# Patient Record
Sex: FEMALE | Race: BLACK OR AFRICAN AMERICAN | Employment: FULL TIME | URBAN - METROPOLITAN AREA
[De-identification: names, ages, dates, MRNs, and addresses within clinical notes are randomized per-mention and may not be internally consistent; named-entity substitution may affect disease eponyms.]

---

## 2017-03-03 ENCOUNTER — HOSPITAL ENCOUNTER (OUTPATIENT)
Dept: ULTRASOUND IMAGING | Age: 27
Discharge: HOME | End: 2017-03-03
Payer: COMMERCIAL

## 2017-03-03 DIAGNOSIS — N64.4 BREAST PAIN: ICD-10-CM

## 2017-03-03 PROCEDURE — 76642 ULTRASOUND BREAST LIMITED: CPT

## 2019-02-27 ENCOUNTER — HOSPITAL ENCOUNTER (EMERGENCY)
Age: 29
Discharge: HOME | End: 2019-02-27
Attending: EMERGENCY MEDICINE
Payer: COMMERCIAL

## 2019-02-27 VITALS
BODY MASS INDEX: 24.11 KG/M2 | DIASTOLIC BLOOD PRESSURE: 70 MMHG | HEART RATE: 72 BPM | SYSTOLIC BLOOD PRESSURE: 110 MMHG | OXYGEN SATURATION: 100 % | HEIGHT: 66 IN | WEIGHT: 150 LBS | TEMPERATURE: 99.2 F | RESPIRATION RATE: 18 BRPM

## 2019-02-27 DIAGNOSIS — R05.8 PRODUCTIVE COUGH: ICD-10-CM

## 2019-02-27 DIAGNOSIS — J20.9 ACUTE BRONCHITIS, UNSPECIFIED ORGANISM: Primary | ICD-10-CM

## 2019-02-27 PROCEDURE — 99282 EMERGENCY DEPT VISIT SF MDM: CPT

## 2019-02-27 RX ORDER — BENZONATATE 100 MG/1
100 CAPSULE ORAL
Qty: 30 CAP | Refills: 0 | Status: SHIPPED | OUTPATIENT
Start: 2019-02-27 | End: 2019-03-06

## 2019-02-27 RX ORDER — BISMUTH SUBSALICYLATE 262 MG
1 TABLET,CHEWABLE ORAL DAILY
COMMUNITY
End: 2022-01-31

## 2019-02-27 RX ORDER — AZITHROMYCIN 250 MG/1
TABLET, FILM COATED ORAL
Qty: 6 TAB | Refills: 0 | Status: SHIPPED | OUTPATIENT
Start: 2019-02-27 | End: 2019-04-08

## 2019-02-27 RX ORDER — GUAIFENESIN 600 MG/1
600 TABLET, EXTENDED RELEASE ORAL 2 TIMES DAILY
COMMUNITY
End: 2019-04-08

## 2019-02-27 NOTE — DISCHARGE INSTRUCTIONS
Patient Education        Bronchitis: Care Instructions  Your Care Instructions    Bronchitis is inflammation of the bronchial tubes, which carry air to the lungs. The tubes swell and produce mucus, or phlegm. The mucus and inflamed bronchial tubes make you cough. You may have trouble breathing. Most cases of bronchitis are caused by viruses like those that cause colds. Antibiotics usually do not help and they may be harmful. Bronchitis usually develops rapidly and lasts about 2 to 3 weeks in otherwise healthy people. Follow-up care is a key part of your treatment and safety. Be sure to make and go to all appointments, and call your doctor if you are having problems. It's also a good idea to know your test results and keep a list of the medicines you take. How can you care for yourself at home? · Take all medicines exactly as prescribed. Call your doctor if you think you are having a problem with your medicine. · Get some extra rest.  · Take an over-the-counter pain medicine, such as acetaminophen (Tylenol), ibuprofen (Advil, Motrin), or naproxen (Aleve) to reduce fever and relieve body aches. Read and follow all instructions on the label. · Do not take two or more pain medicines at the same time unless the doctor told you to. Many pain medicines have acetaminophen, which is Tylenol. Too much acetaminophen (Tylenol) can be harmful. · Take an over-the-counter cough medicine that contains dextromethorphan to help quiet a dry, hacking cough so that you can sleep. Avoid cough medicines that have more than one active ingredient. Read and follow all instructions on the label. · Breathe moist air from a humidifier, hot shower, or sink filled with hot water. The heat and moisture will thin mucus so you can cough it out. · Do not smoke. Smoking can make bronchitis worse. If you need help quitting, talk to your doctor about stop-smoking programs and medicines.  These can increase your chances of quitting for good.  When should you call for help? Call 911 anytime you think you may need emergency care. For example, call if:    · You have severe trouble breathing.    Call your doctor now or seek immediate medical care if:    · You have new or worse trouble breathing.     · You cough up dark brown or bloody mucus (sputum).     · You have a new or higher fever.     · You have a new rash.    Watch closely for changes in your health, and be sure to contact your doctor if:    · You cough more deeply or more often, especially if you notice more mucus or a change in the color of your mucus.     · You are not getting better as expected. Where can you learn more? Go to http://tannerPertinopaul.info/. Enter H333 in the search box to learn more about \"Bronchitis: Care Instructions. \"  Current as of: September 5, 2018  Content Version: 11.9  © 9545-2502 [employer]. Care instructions adapted under license by [employer] (which disclaims liability or warranty for this information). If you have questions about a medical condition or this instruction, always ask your healthcare professional. [place] [address] any warranty or liability for your use of this information. Patient Education        Cough: Care Instructions  Your Care Instructions    A cough is your body's response to something that bothers your throat or airways. Many things can cause a cough. You might cough because of a cold or the flu, bronchitis, or asthma. Smoking, postnasal drip, allergies, and stomach acid that backs up into your throat also can cause coughs. A cough is a symptom, not a disease. Most coughs stop when the cause, such as a cold, goes away. You can take a few steps at home to cough less and feel better. Follow-up care is a key part of your treatment and safety. Be sure to make and go to all appointments, and call your doctor if you are having problems.  It's also a good idea to know your test results and keep a list of the medicines you take. How can you care for yourself at home? · Drink lots of water and other fluids. This helps thin the mucus and soothes a dry or sore throat. Honey or lemon juice in hot water or tea may ease a dry cough. · Take cough medicine as directed by your doctor. · Prop up your head on pillows to help you breathe and ease a dry cough. · Try cough drops to soothe a dry or sore throat. Cough drops don't stop a cough. Medicine-flavored cough drops are no better than candy-flavored drops or hard candy. · Do not smoke. Avoid secondhand smoke. If you need help quitting, talk to your doctor about stop-smoking programs and medicines. These can increase your chances of quitting for good. When should you call for help? Call 911 anytime you think you may need emergency care. For example, call if:    · You have severe trouble breathing.    Call your doctor now or seek immediate medical care if:    · You cough up blood.     · You have new or worse trouble breathing.     · You have a new or higher fever.     · You have a new rash.    Watch closely for changes in your health, and be sure to contact your doctor if:    · You cough more deeply or more often, especially if you notice more mucus or a change in the color of your mucus.     · You have new symptoms, such as a sore throat, an earache, or sinus pain.     · You do not get better as expected. Where can you learn more? Go to http://tanner-paul.info/. Enter D279 in the search box to learn more about \"Cough: Care Instructions. \"  Current as of: September 5, 2018  Content Version: 11.9  © 2150-4177 [employer]. Care instructions adapted under license by [employer] (which disclaims liability or warranty for this information).  If you have questions about a medical condition or this instruction, always ask your healthcare professional. Joceline Yo disclaims any warranty or liability for your use of this information. MyChart Activation    Thank you for requesting access to [facility]. Please follow the instructions below to securely access and download your online medical record. [facility] allows you to send messages to your doctor, view your test results, renew your prescriptions, schedule appointments, and more. How Do I Sign Up? 1. In your internet browser, go to [email or web address]  2. Click on the First Time User? Click Here link in the Sign In box. You will be redirect to the New Member Sign Up page. 3. Enter your [facility] Access Code exactly as it appears below. You will not need to use this code after youve completed the sign-up process. If you do not sign up before the expiration date, you must request a new code. [facility] Access Code: [email or web address]  Expires: 2019  5:26 PM (This is the date your [facility] access code will )    4. Enter the last four digits of your Social Security Number (xxxx) and Date of Birth (mm/dd/yyyy) as indicated and click Submit. You will be taken to the next sign-up page. 5. Create a [facility] ID. This will be your [facility] login ID and cannot be changed, so think of one that is secure and easy to remember. 6. Create a [facility] password. You can change your password at any time. 7. Enter your Password Reset Question and Answer. This can be used at a later time if you forget your password. 8. Enter your e-mail address. You will receive e-mail notification when new information is available in [address]. 9. Click Sign Up. You can now view and download portions of your medical record. 10. Click the Download Summary menu link to download a portable copy of your medical information. Additional Information    If you have questions, please visit the Frequently Asked Questions section of the [facility] website at [email or web address] Remember, [facility] is NOT to be used for urgent needs.  For medical emergencies, dial 911. Complete all medications as prescribed. Follow-up with primary care doctor in 1 week. Return to the ED immediately for any new or worsening symptoms.

## 2019-02-27 NOTE — ED TRIAGE NOTES
Cough x 2 weeks. States both daughters Dx with URI yesterday. Has been having hot/cold spells. Denies pain.

## 2019-02-27 NOTE — ED PROVIDER NOTES
EMERGENCY DEPARTMENT HISTORY AND PHYSICAL EXAM 
 
Date: 2/27/2019 Patient Name: [facility] History of Presenting Illness Chief Complaint Patient presents with  URI History Provided By: patient Chief Complaint: cough Duration: 2 weeks Timing: acute Location: chest 
Quality: productive Severity:moderate Modifying Factors: OTC meds have not helped Associated Symptoms congestion Additional History (Context): [facility] is a 30 y.o. female with no PMH who presents with complaints of productive cough and congestion x 2 weeks. Sx not alleviated by OTC meds. No other complaints. PCP: Orville Hylton MD 
 
Current Outpatient Medications Medication Sig Dispense Refill  multivitamin (ONE A DAY) tablet Take 1 Tab by mouth daily.  guaiFENesin ER (MUCINEX) 600 mg ER tablet Take 600 mg by mouth two (2) times a day.  benzonatate (TESSALON PERLES) 100 mg capsule Take 1 Cap by mouth three (3) times daily as needed for Cough for up to 7 days. 30 Cap 0  
 azithromycin (ZITHROMAX) 250 mg tablet Use as directed 6 Tab 0 Past History Past Medical History: 
Past Medical History:  
Diagnosis Date  Constipation  Dysphagia Past Surgical History: 
Past Surgical History:  
Procedure Laterality Date  HX GYN    
 IUD removed Family History: 
Family History Problem Relation Age of Onset  Crohn's Disease Brother Social History: 
Social History Tobacco Use  Smoking status: Never Smoker  Smokeless tobacco: Never Used Substance Use Topics  Alcohol use: Yes Comment: socially  Drug use: No  
 
 
Allergies: 
No Known Allergies Review of Systems Review of Systems Constitutional: Negative. Negative for chills and fever. HENT: Positive for congestion. Negative for ear pain and rhinorrhea. Eyes: Negative. Negative for pain and redness. Respiratory: Positive for cough.  Negative for shortness of breath, wheezing and stridor. Cardiovascular: Negative. Negative for chest pain and leg swelling. Gastrointestinal: Negative. Negative for abdominal pain, constipation, diarrhea, nausea and vomiting. Genitourinary: Negative. Negative for dysuria and frequency. Musculoskeletal: Negative. Negative for back pain and neck pain. Skin: Negative. Negative for rash and wound. Neurological: Negative. Negative for dizziness, seizures, syncope and headaches. All other systems reviewed and are negative. All Other Systems Negative Physical Exam  
 
Vitals:  
 02/27/19 1725 BP: 110/70 Pulse: 72 Resp: 18 Temp: 99.2 °F (37.3 °C) SpO2: 100% Weight: 68 kg (150 lb) Height: 5' 6\" (1.676 m) Physical Exam  
Constitutional: She is oriented to person, place, and time. She appears well-developed and well-nourished. No distress. HENT:  
Head: Normocephalic and atraumatic. Eyes: Conjunctivae are normal. Right eye exhibits no discharge. Left eye exhibits no discharge. No scleral icterus. Neck: Normal range of motion. Neck supple. Cardiovascular: Normal rate, regular rhythm and normal heart sounds. Exam reveals no gallop and no friction rub. No murmur heard. Pulmonary/Chest: Effort normal and breath sounds normal. No stridor. No respiratory distress. She has no wheezes. She has no rales. Coughing during the exam   
Musculoskeletal: Normal range of motion. Neurological: She is alert and oriented to person, place, and time. Coordination normal.  
Gait is steady. Able to ambulate without difficulty. Skin: Skin is warm and dry. No rash noted. She is not diaphoretic. No erythema. Psychiatric: She has a normal mood and affect. Her behavior is normal. Thought content normal.  
Nursing note and vitals reviewed. Diagnostic Study Results Labs - No results found for this or any previous visit (from the past 12 hour(s)). Radiologic Studies - No orders to display CT Results  (Last 48 hours) None CXR Results  (Last 48 hours) None Medical Decision Making I am the first provider for this patient. I reviewed the vital signs, available nursing notes, past medical history, past surgical history, family history and social history. Vital Signs-Reviewed the patient's vital signs. Records Reviewed: Odalys Rogers PA-C Procedures: 
Procedures Provider Notes (Medical Decision Making): Impression:  Acute bronchitis, cough Will d/c pt with zpack and cough meds, pcp follow-up recommended. Pt agrees with this plan. Odalys Rogers PA-C  
 
MED RECONCILIATION: 
No current facility-administered medications for this encounter. Current Outpatient Medications Medication Sig  
 multivitamin (ONE A DAY) tablet Take 1 Tab by mouth daily.  guaiFENesin ER (MUCINEX) 600 mg ER tablet Take 600 mg by mouth two (2) times a day.  benzonatate (TESSALON PERLES) 100 mg capsule Take 1 Cap by mouth three (3) times daily as needed for Cough for up to 7 days.  azithromycin (ZITHROMAX) 250 mg tablet Use as directed Disposition: D/c 
 
DISCHARGE NOTE:  
Patient is stable for discharge at this time. Rx for zpack and tessalon given. Rest and follow-up with PCP this week. Return to the ED immediately for any new or worsening sx. Odalys Mayen PA-C 6:49 PM  
 
Follow-up Information Follow up With Specialties Details Why Contact Info Supriya Galindo MD Family Practice Schedule an appointment as soon as possible for a visit in 1 week  [address] [place] [address] [address] [place] [id number] 
[phone] [id number] [facility] EMERGENCY DEPT Emergency Medicine  As needed, If symptoms worsen [place]anny [place] [id number] [phone] Current Discharge Medication List  
  
START taking these medications  Details  
benzonatate (TESSALON PERLES) 100 mg capsule Take 1 Cap by mouth three (3) times daily as needed for Cough for up to 7 days. Qty: 30 Cap, Refills: 0  
  
azithromycin (ZITHROMAX) 250 mg tablet Use as directed Qty: 6 Tab, Refills: 0 Diagnosis Clinical Impression: 1. Acute bronchitis, unspecified organism 2. Productive cough

## 2019-04-08 ENCOUNTER — HOSPITAL ENCOUNTER (OUTPATIENT)
Dept: LAB | Age: 29
Discharge: HOME | End: 2019-04-08

## 2019-04-08 ENCOUNTER — OFFICE VISIT (OUTPATIENT)
Dept: FAMILY MEDICINE CLINIC | Age: 29
End: 2019-04-08

## 2019-04-08 VITALS
DIASTOLIC BLOOD PRESSURE: 74 MMHG | BODY MASS INDEX: 23.19 KG/M2 | HEART RATE: 73 BPM | OXYGEN SATURATION: 98 % | RESPIRATION RATE: 16 BRPM | HEIGHT: 68 IN | WEIGHT: 153 LBS | SYSTOLIC BLOOD PRESSURE: 100 MMHG | TEMPERATURE: 98.3 F

## 2019-04-08 DIAGNOSIS — R61 NIGHT SWEATS: ICD-10-CM

## 2019-04-08 DIAGNOSIS — K59.00 CONSTIPATION, UNSPECIFIED CONSTIPATION TYPE: ICD-10-CM

## 2019-04-08 DIAGNOSIS — Z88.9 H/O SEASONAL ALLERGIES: ICD-10-CM

## 2019-04-08 DIAGNOSIS — R23.8 OTHER SKIN CHANGES: ICD-10-CM

## 2019-04-08 DIAGNOSIS — R05.9 COUGH: Primary | ICD-10-CM

## 2019-04-08 DIAGNOSIS — Z30.42 DEPO-PROVERA CONTRACEPTIVE STATUS: ICD-10-CM

## 2019-04-08 DIAGNOSIS — R53.83 FATIGUE, UNSPECIFIED TYPE: ICD-10-CM

## 2019-04-08 DIAGNOSIS — K42.9 UMBILICAL HERNIA WITHOUT OBSTRUCTION AND WITHOUT GANGRENE: ICD-10-CM

## 2019-04-08 LAB — XX-LABCORP SPECIMEN COL,LCBCF: NORMAL

## 2019-04-08 PROCEDURE — 99001 SPECIMEN HANDLING PT-LAB: CPT

## 2019-04-08 RX ORDER — UREA 10 %
800 LOTION (ML) TOPICAL DAILY
COMMUNITY
End: 2022-01-31

## 2019-04-08 RX ORDER — MEDROXYPROGESTERONE ACETATE 150 MG/ML
150 INJECTION, SUSPENSION INTRAMUSCULAR ONCE
COMMUNITY
End: 2020-07-13

## 2019-04-08 RX ORDER — LORATADINE 10 MG/1
10 TABLET ORAL DAILY
COMMUNITY
End: 2020-03-13

## 2019-04-08 RX ORDER — VITAMIN E CAP 100 UNIT 100 UNIT
CAP ORAL DAILY
COMMUNITY
End: 2022-01-31

## 2019-04-08 RX ORDER — MONTELUKAST SODIUM 10 MG/1
10 TABLET ORAL DAILY
Qty: 30 TAB | Refills: 1 | Status: SHIPPED | OUTPATIENT
Start: 2019-04-08 | End: 2019-05-08 | Stop reason: SDUPTHER

## 2019-04-08 RX ORDER — ALBUTEROL SULFATE 90 UG/1
2 AEROSOL, METERED RESPIRATORY (INHALATION)
Qty: 1 INHALER | Refills: 0 | Status: SHIPPED | OUTPATIENT
Start: 2019-04-08 | End: 2022-01-31

## 2019-04-08 RX ORDER — GLUCOSAMINE/CHONDR SU A SOD 750-600 MG
TABLET ORAL
COMMUNITY
End: 2022-01-31

## 2019-04-08 RX ORDER — UREA 10 %
100 LOTION (ML) TOPICAL DAILY
COMMUNITY
End: 2022-01-31

## 2019-04-08 NOTE — PATIENT INSTRUCTIONS
Constipation: Care Instructions Your Care Instructions Constipation means that you have a hard time passing stools (bowel movements). People pass stools from 3 times a day to once every 3 days. What is normal for you may be different. Constipation may occur with pain in the rectum and cramping. The pain may get worse when you try to pass stools. Sometimes there are small amounts of bright red blood on toilet paper or the surface of stools. This is because of enlarged veins near the rectum (hemorrhoids). A few changes in your diet and lifestyle may help you avoid ongoing constipation. Your doctor may also prescribe medicine to help loosen your stool. Some medicines can cause constipation. These include pain medicines and antidepressants. Tell your doctor about all the medicines you take. Your doctor may want to make a medicine change to ease your symptoms. Follow-up care is a key part of your treatment and safety. Be sure to make and go to all appointments, and call your doctor if you are having problems. It's also a good idea to know your test results and keep a list of the medicines you take. How can you care for yourself at home? · Drink plenty of fluids, enough so that your urine is light yellow or clear like water. If you have kidney, heart, or liver disease and have to limit fluids, talk with your doctor before you increase the amount of fluids you drink. · Include high-fiber foods in your diet each day. These include fruits, vegetables, beans, and whole grains. · Get at least 30 minutes of exercise on most days of the week. Walking is a good choice. You also may want to do other activities, such as running, swimming, cycling, or playing tennis or team sports. · Take a fiber supplement, such as Citrucel or Metamucil, every day. Read and follow all instructions on the label. · Schedule time each day for a bowel movement. A daily routine may help. Take your time having your bowel movement. · Support your feet with a small step stool when you sit on the toilet. This helps flex your hips and places your pelvis in a squatting position. · Your doctor may recommend an over-the-counter laxative to relieve your constipation. Examples are Milk of Magnesia and MiraLax. Read and follow all instructions on the label. Do not use laxatives on a long-term basis. When should you call for help? Call your doctor now or seek immediate medical care if: 
  · You have new or worse belly pain.  
  · You have new or worse nausea or vomiting.  
  · You have blood in your stools.  
 Watch closely for changes in your health, and be sure to contact your doctor if: 
  · Your constipation is getting worse.  
  · You do not get better as expected. Where can you learn more? Go to http://tanner-paul.info/. Enter 21 [phone] in the search box to learn more about \"Constipation: Care Instructions. \" Current as of: September 23, 2018 Content Version: 11.9 © 4293-8562 [employer]. Care instructions adapted under license by [employer] (which disclaims liability or warranty for this information). If you have questions about a medical condition or this instruction, always ask your healthcare professional. [place] [address] any warranty or liability for your use of this information. High-Fiber Diet: Care Instructions Your Care Instructions A high-fiber diet may help you relieve constipation and feel less bloated. Your doctor and dietitian will help you make a high-fiber eating plan based on your personal needs. The plan will include the things you like to eat. It will also make sure that you get 30 grams of fiber a day. Before you make changes to the way you eat, be sure to talk with your doctor or dietitian. Follow-up care is a key part of your treatment and safety.  Be sure to make and go to all appointments, and call your doctor if you are having problems. It's also a good idea to know your test results and keep a list of the medicines you take. How can you care for yourself at home? · You can increase how much fiber you get if you eat more of certain foods. These foods include: 
? Whole-grain breads and cereals. ? Fruits, such as pears, apples, and peaches. Eat the skins, peels, and seeds, if you can. 
? Vegetables, such as broccoli, cabbage, spinach, carrots, asparagus, and squash. ? Starchy vegetables. These include potatoes with skins, kidney beans, and lima beans. · Take a fiber supplement every day if your doctor recommends it. Examples are Benefiber, Citrucel, FiberCon, and Metamucil. Ask your doctor how much to take. · Drink plenty of fluids, enough so that your urine is light yellow or clear like water. If you have kidney, heart, or liver disease and have to limit fluids, talk with your doctor before you increase the amount of fluids you drink. · Get some exercise every day. Exercise helps stool move through the colon. It also helps prevent constipation. · Keep a food diary. Try to notice and write down what foods cause gas, pain, or other symptoms. Then you can avoid these foods. Where can you learn more? Go to http://tanner-paul.info/. Enter Q570 in the search box to learn more about \"High-Fiber Diet: Care Instructions. \" Current as of: March 28, 2018 Content Version: 11.9 © 3267-6525 [employer]. Care instructions adapted under license by [employer] (which disclaims liability or warranty for this information). If you have questions about a medical condition or this instruction, always ask your healthcare professional. [address] any warranty or liability for your use of this information. Eating Healthy Foods: Care Instructions Your Care Instructions Eating healthy foods can help lower your risk for disease.  Healthy food gives you energy and keeps your heart strong, your brain active, your muscles working, and your bones strong. A healthy diet includes a variety of foods from the basic food groups: grains, vegetables, fruits, milk and milk products, and meat and beans. Some people may eat more of their favorite foods from only one food group and, as a result, miss getting the nutrients they need. So, it is important to pay attention not only to what you eat but also to what you are missing from your diet. You can eat a healthy, balanced diet by making a few small changes. Follow-up care is a key part of your treatment and safety. Be sure to make and go to all appointments, and call your doctor if you are having problems. It's also a good idea to know your test results and keep a list of the medicines you take. How can you care for yourself at home? Look at what you eat · Keep a food diary for a week or two and record everything you eat or drink. Track the number of servings you eat from each food group. · For a balanced diet every day, eat a variety of: 
? 6 or more ounce-equivalents of grains, such as cereals, breads, crackers, rice, or pasta, every day. An ounce-equivalent is 1 slice of bread, 1 cup of ready-to-eat cereal, or ½ cup of cooked rice, cooked pasta, or cooked cereal. 
? 2½ cups of vegetables, especially: § Dark-green vegetables such as broccoli and spinach. § Orange vegetables such as carrots and sweet potatoes. § Dry beans (such as kinsey and kidney beans) and peas (such as lentils). ? 2 cups of fresh, frozen, or canned fruit. A small apple or 1 banana or orange equals 1 cup. ? 3 cups of nonfat or low-fat milk, yogurt, or other milk products. ? 5½ ounces of meat and beans, such as chicken, fish, lean meat, beans, nuts, and seeds. One egg, 1 tablespoon of peanut butter, ½ ounce nuts or seeds, or ¼ cup of cooked beans equals 1 ounce of meat. · Learn how to read food labels for serving sizes and ingredients. Fast-food and convenience-food meals often contain few or no fruits or vegetables. Make sure you eat some fruits and vegetables to make the meal more nutritious. · Look at your food diary. For each food group, add up what you have eaten and then divide the total by the number of days. This will give you an idea of how much you are eating from each food group. See if you can find some ways to change your diet to make it more healthy. Start small · Do not try to make dramatic changes to your diet all at once. You might feel that you are missing out on your favorite foods and then be more likely to fail. · Start slowly, and gradually change your habits. Try some of the following: ? Use whole wheat bread instead of white bread. ? Use nonfat or low-fat milk instead of whole milk. ? Eat brown rice instead of white rice, and eat whole wheat pasta instead of white-flour pasta. ? Try low-fat cheeses and low-fat yogurt. ? Add more fruits and vegetables to meals and have them for snacks. ? Add lettuce, tomato, cucumber, and onion to sandwiches. ? Add fruit to yogurt and cereal. 
Enjoy food · You can still eat your favorite foods. You just may need to eat less of them. If your favorite foods are high in fat, salt, and sugar, limit how often you eat them, but do not cut them out entirely. · Eat a wide variety of foods. Make healthy choices when eating out · The type of restaurant you choose can help you make healthy choices. Even fast-food chains are now offering more low-fat or healthier choices on the menu. · Choose smaller portions, or take half of your meal home. · When eating out, try: ? A veggie pizza with a whole wheat crust or grilled chicken (instead of sausage or pepperoni). ? Pasta with roasted vegetables, grilled chicken, or marinara sauce instead of cream sauce. ? A vegetable wrap or grilled chicken wrap. ? Broiled or poached food instead of fried or breaded items. Make healthy choices easy · Buy packaged, prewashed, ready-to-eat fresh vegetables and fruits, such as baby carrots, salad mixes, and chopped or shredded broccoli and cauliflower. · Buy packaged, presliced fruits, such as melon or pineapple. · Choose 100% fruit or vegetable juice instead of soda. Limit juice intake to 4 to 6 oz (½ to ¾ cup) a day. · Blend low-fat yogurt, fruit juice, and canned or frozen fruit to make a smoothie for breakfast or a snack. Where can you learn more? Go to http://tannerPulsantapul.info/. Enter T756 in the search box to learn more about \"Eating Healthy Foods: Care Instructions. \" Current as of: March 28, 2018 Content Version: 11.9 © 8345-9451 [employer]. Care instructions adapted under license by [employer] (which disclaims liability or warranty for this information). If you have questions about a medical condition or this instruction, always ask your healthcare professional. [place] [address] any warranty or liability for your use of this information. Iron-Rich Diet: Care Instructions Your Care Instructions Your body needs iron to make hemoglobin. Hemoglobin is a substance in red blood cells that carries oxygen from the lungs to cells all through your body. If you do not get enough iron, your body makes fewer and smaller red blood cells. As a result, your body's cells may not get enough oxygen. Adult men need 8 milligrams of iron a day; adult women need 18 milligrams of iron a day. After menopause, women need 8 milligrams of iron a day. A pregnant woman needs 27 milligrams of iron a day. Infants and young children have higher iron needs relative to their size than other age groups. People who have lost blood because of ulcers or heavy menstrual periods may become very low in iron and may develop anemia.  
Most people can get the iron their bodies need by eating enough of certain iron-rich foods. Your doctor may recommend that you take an iron supplement along with eating an iron-rich diet. Follow-up care is a key part of your treatment and safety. Be sure to make and go to all appointments, and call your doctor if you are having problems. It's also a good idea to know your test results and keep a list of the medicines you take. How can you care for yourself at home? · Make iron-rich foods a part of your daily diet. Iron-rich foods include: ? All meats, such as chicken, beef, lamb, pork, fish, and shellfish. Liver is especially high in iron. ? Leafy green vegetables. ? Raisins, peas, beans, lentils, barley, and eggs. ? Iron-fortified breakfast cereals. · Eat foods with vitamin C along with iron-rich foods. Vitamin C helps you absorb more iron from food. Drink a glass of orange juice or another citrus juice with your food. · Eat meat and vegetables or grains together. The iron in meat helps your body absorb the iron in other foods. Where can you learn more? Go to http://tannerMTPVpaul.info/. Enter [id number] [id number] in the search box to learn more about \"Iron-Rich Diet: Care Instructions. \" Current as of: March 28, 2018 Content Version: 11.9 © 9022-2007 [employer]. Care instructions adapted under license by [employer] (which disclaims liability or warranty for this information). If you have questions about a medical condition or this instruction, always ask your healthcare professional. [place] [address] any warranty or liability for your use of this information. Learning About Physical Activity What is physical activity? Physical activity is any kind of activity that gets your body moving. The types of physical activity that can help you get fit and stay healthy include: · Aerobic or \"cardio\" activities that make your heart beat faster and make you breathe harder, such as brisk walking, riding a bike, or running. Aerobic activities strengthen your heart and lungs and build up your endurance. · Strength training activities that make your muscles work against, or \"resist,\" something, such as lifting weights or doing push-ups. These activities help tone and strengthen your muscles. · Stretches that allow you to move your joints and muscles through their full range of motion. Stretching helps you be more flexible and avoid injury. What are the benefits of physical activity? Being active is one of the best things you can do to get fit and stay healthy. It helps you to: · Feel stronger and have more energy to do all the things you like to do. · Focus better at school or work and perform better in sports. · Feel, think, and sleep better. · Reach and stay at a healthy weight. · Lose fat and build lean muscle. · Lower your risk for serious health problems. · Keep your bones, muscles, and joints strong. Being fit lets you do more physical activity. And it lets you work out harder without as much effort. How can you make physical activity part of your life? Get at least 30 minutes of exercise on most days of the week. Walking is a good choice. You also may want to do other activities, such as running, swimming, cycling, or playing tennis or team sports. Pick activities that you likeones that make your heart beat faster, your muscles stronger, and your muscles and joints more flexible. If you find more than one thing you like doing, do them all. You don't have to do the same thing every day. Get your heart pumping every day. Any activity that makes your heart beat faster and keeps it at that rate for a while counts. Here are some great ways to get your heart beating faster: · Go for a brisk walk, run, or bike ride. · Go for a hike or swim. · Go in-line skating. · Play a game of touch football, basketball, or soccer. · Ride a bike. · Play tennis or racquetball. · Climb stairs. Even some household chores can be aerobicjust do them at a faster pace. Vacuuming, raking or mowing the lawn, sweeping the garage, and washing and waxing the car all can help get your heart rate up. Strengthen your muscles during the week. You don't have to lift heavy weights or grow big, bulky muscles to get stronger. Doing a few simple activities that make your muscles work against, or \"resist,\" something can help you get stronger. For example, you can: · Do push-ups or sit-ups, which use your own body weight as resistance. · Lift weights or dumbbells or use stretch bands at home or in a gym or community center. Stretch your muscles often. Stretching will help you as you become more active. It can help you stay flexible, loosen tight muscles, and avoid injury. It can also help improve your balance and posture and can be a great way to relax. Be sure to stretch the muscles you'll be using when you work out. It's best to warm your muscles slightly before you stretch them. Walk or do some other light aerobic activity for a few minutes, and then start stretching. When you stretch your muscles: · Do it slowly. Stretching is not about going fast or making sudden movements. · Don't push or bounce during a stretch. · Hold each stretch for at least 15 to 30 seconds, if you can. You should feel a stretch in the muscle, but not pain. · Breathe out as you do the stretch. Then breathe in as you hold the stretch. Don't hold your breath. If you're worried about how more activity might affect your health, have a checkup before you start. Follow any special advice your doctor gives you for getting a smart start. Where can you learn more? Go to http://tanner-paul.info/. Enter R441 in the search box to learn more about \"Learning About Physical Activity. \" Current as of: August 19, 2018 Content Version: 11.9 © 4938-8366 [employer].  Care instructions adapted under license by [address] (which disclaims liability or warranty for this information). If you have questions about a medical condition or this instruction, always ask your healthcare professional. [address] any warranty or liability for your use of this information.

## 2019-04-08 NOTE — PROGRESS NOTES
Chief Complaint Patient presents with [facility], [place], operated by [facility]  Hernia (Non Specific)  Mole New  Sinus Pain  Dry Skin

## 2019-04-09 LAB
25(OH)D3+25(OH)D2 SERPL-MCNC: 29.1 NG/ML (ref 30–100)
ALBUMIN SERPL-MCNC: 4.4 G/DL (ref 3.5–5.5)
ALBUMIN/GLOB SERPL: 1.6 {RATIO} (ref 1.2–2.2)
ALP SERPL-CCNC: 51 IU/L (ref 39–117)
ALT SERPL-CCNC: 8 IU/L (ref 0–32)
AST SERPL-CCNC: 18 IU/L (ref 0–40)
BILIRUB SERPL-MCNC: 0.3 MG/DL (ref 0–1.2)
BUN SERPL-MCNC: 9 MG/DL (ref 6–20)
BUN/CREAT SERPL: 12 (ref 9–23)
CALCIUM SERPL-MCNC: 9 MG/DL (ref 8.7–10.2)
CHLORIDE SERPL-SCNC: 103 MMOL/L (ref 96–106)
CO2 SERPL-SCNC: 23 MMOL/L (ref 20–29)
CREAT SERPL-MCNC: 0.73 MG/DL (ref 0.57–1)
ERYTHROCYTE [DISTWIDTH] IN BLOOD BY AUTOMATED COUNT: 13.6 % (ref 12.3–15.4)
GLOBULIN SER CALC-MCNC: 2.7 G/DL (ref 1.5–4.5)
GLUCOSE SERPL-MCNC: 88 MG/DL (ref 65–99)
HCT VFR BLD AUTO: 38.5 % (ref 34–46.6)
HGB BLD-MCNC: 12.8 G/DL (ref 11.1–15.9)
MCH RBC QN AUTO: 30.5 PG (ref 26.6–33)
MCHC RBC AUTO-ENTMCNC: 33.2 G/DL (ref 31.5–35.7)
MCV RBC AUTO: 92 FL (ref 79–97)
PLATELET # BLD AUTO: 253 X10E3/UL (ref 150–379)
POTASSIUM SERPL-SCNC: 4 MMOL/L (ref 3.5–5.2)
PROT SERPL-MCNC: 7.1 G/DL (ref 6–8.5)
RBC # BLD AUTO: 4.19 X10E6/UL (ref 3.77–5.28)
SODIUM SERPL-SCNC: 142 MMOL/L (ref 134–144)
TSH SERPL DL<=0.005 MIU/L-ACNC: 1.37 UIU/ML (ref 0.45–4.5)
WBC # BLD AUTO: 5.5 X10E3/UL (ref 3.4–10.8)

## 2019-04-09 NOTE — PROGRESS NOTES
HISTORY OF PRESENT ILLNESS Clari Canas is a 30 y.o. female. HPI: new patient. Here with few medical concern. C/o having cough with on and off sputum. Also feeling clearing throat often and post nasal drip along with sinus congestion on and off. No wheezing. No sob. No chest congestion. No h/o asthma. Not using any medication except otc decongestant. No sore throat. Sitting comfortable. Not in an acute distress. No nausea or vomiting, no fever. No abdominal pain. No urinary or bowel complains. Has h/o seasonal allergies Also concern with hernia as she was told by prior physician. Noted on exam umbilical hernia. No pain. No constipation. No nausea or vomiting. Appetite is fair. Uses depo shot. Not getting period. Said after long time noted spotting. No abdominal pain or cramps. No pelvic pain. Noted a small mole over rt mid leg. New onset. Tiny, hyperpigmented, flat. No open skin. No itching or pain. Feeling fatigue on and off. No known thyroid problem. . No joint pain or swelling. Taking multivitamin. Visit Vitals /74 (BP 1 Location: Left arm, BP Patient Position: Sitting) Pulse 73 Temp 98.3 °F (36.8 °C) (Oral) Resp 16 Ht 5' 8\" (1.727 m) Wt 153 lb (69.4 kg) SpO2 98% BMI 23.26 kg/m² Review medication list, vitals, problem list,allergies. Review past./ personal/ family / surgical history. ROS: Denies any headache, dizziness, no chest pain or trouble breathing, no arm or leg weakness. No nausea or vomiting, no weight or appetite changes, no mood changes . No urine or bowel complains, no palpitation, no diaphoresis. No abdominal pain. No cold or cough. No leg swelling. No fever. No sleep trouble. Physical Exam  
Constitutional: She is oriented to person, place, and time. No distress. Cardiovascular: Normal rate, regular rhythm and normal heart sounds. Pulmonary/Chest: CTA Abdominal: Soft. Bowel sounds are normal. There is no tenderness. Small umbilical hernia. Reducible. Musculoskeletal: She exhibits no edema. Neurological: She is oriented to person, place, and time. ASSESSMENT and PLAN 
  ICD-10-CM ICD-9-CM 1. Cough: probably due to post nasal drip and seasonal allergies. Starting  singulair. Albuterol as needed. F/u next visit. If no improvement can consider nasal spray  R05 786.2 montelukast (SINGULAIR) 10 mg tablet  
   albuterol (PROVENTIL HFA, VENTOLIN HFA, PROAIR HFA) 90 mcg/actuation inhaler 2. H/O seasonal allergies Z88.9 V15.09 montelukast (SINGULAIR) 10 mg tablet 3. Umbilical hernia without obstruction and without gangrene: for now sending for surgical evaluation. No abdominal pain. K42.9 553.1 REFERRAL TO GENERAL SURGERY 4. Constipation, unspecified constipation type: on and off. Asymptomatic at this time. Advised diet modification and also metamucil otc. K59.00 564.00   
5. Depo-Provera contraceptive status: spotting once after long time. Advised to observe and f/u next visit. Z30.42 V25.49   
6. Other skin changes: mole over rt mid leg. For now observe. Benign  R23.8 782.9 7. Night sweats: checking labs. S28 182.9 METABOLIC PANEL, COMPREHENSIVE  
   CBC W/O DIFF  
   TSH 3RD GENERATION 8. Fatigue, unspecified type: checking labs. On multivitamin at this time. Not having period as on depo. O86.25 429.46 METABOLIC PANEL, COMPREHENSIVE  
   CBC W/O DIFF  
   TSH 3RD GENERATION  
   VITAMIN D, 25 HYDROXY Pt understood and agree with the plan Review  Follow-up and Dispositions · Return in about 2 months (around 6/8/2019).

## 2019-04-09 NOTE — PROGRESS NOTES
Low vitamin d. For now otc 2000 units of vitamin D3 daily. Other labs fairly stable. Further discussion on follow up visit.

## 2019-04-09 NOTE — PROGRESS NOTES
Contacted patient and verified identity using name and date of birth (2- identifiers) Spoke with patient and she verbalized understanding of low Vit D and need for Vit D3 OTC 2000 units daily. Remaining labs are stable.

## 2019-04-29 ENCOUNTER — HOSPITAL ENCOUNTER (EMERGENCY)
Age: 29
Discharge: HOME | End: 2019-04-29
Attending: EMERGENCY MEDICINE | Admitting: EMERGENCY MEDICINE
Payer: COMMERCIAL

## 2019-04-29 VITALS
OXYGEN SATURATION: 100 % | WEIGHT: 155 LBS | DIASTOLIC BLOOD PRESSURE: 81 MMHG | HEART RATE: 77 BPM | BODY MASS INDEX: 23.49 KG/M2 | TEMPERATURE: 99.7 F | SYSTOLIC BLOOD PRESSURE: 120 MMHG | RESPIRATION RATE: 16 BRPM | HEIGHT: 68 IN

## 2019-04-29 DIAGNOSIS — J34.0 CELLULITIS OF EXTERNAL NOSE: Primary | ICD-10-CM

## 2019-04-29 PROCEDURE — 99282 EMERGENCY DEPT VISIT SF MDM: CPT

## 2019-04-29 RX ORDER — MUPIROCIN CALCIUM 20 MG/G
CREAM TOPICAL 2 TIMES DAILY
Qty: 15 G | Refills: 0 | Status: SHIPPED | OUTPATIENT
Start: 2019-04-29 | End: 2020-03-13

## 2019-04-29 RX ORDER — CEPHALEXIN 500 MG/1
500 CAPSULE ORAL 3 TIMES DAILY
Qty: 30 CAP | Refills: 0 | Status: SHIPPED | OUTPATIENT
Start: 2019-04-29 | End: 2019-05-09

## 2019-04-29 NOTE — ED PROVIDER NOTES
EMERGENCY DEPARTMENT HISTORY AND PHYSICAL EXAM 
 
Date: (Not on file) Patient Name: [facility] History of Presenting Illness Chief Complaint Patient presents with  
 Skin Problem History Provided By: Patient Chief Complaint: skin infection Duration: 5 Days Timing:  Acute and Worsening Location: nose Additional History (Context): [facility] is a 30 y.o. female with No significant past medical history who presents with nose infection. Pt states there was a small scratch to her nose Thurs and by constantly adjusting her glasses, a larger area of redness started. She applied neosporin which seemed to make it worse. Denies fever, chills, epistaxis or any other symptoms or complaints. PCP: Puja Wilson MD 
 
Current Outpatient Medications Medication Sig Dispense Refill  folic acid 919 mcg tablet Take 800 mcg by mouth daily.  vitamin e (E GEMS) 100 unit capsule Take  by mouth daily.  cyanocobalamin (VITAMIN B12) 100 mcg tablet Take 100 mcg by mouth daily.  loratadine (CLARITIN) 10 mg tablet Take 10 mg by mouth.  Biotin 2,500 mcg cap Take  by mouth.  medroxyPROGESTERone (DEPO-PROVERA) 150 mg/mL injection 150 mg by IntraMUSCular route once.  montelukast (SINGULAIR) 10 mg tablet Take 1 Tab by mouth daily. 30 Tab 1  
 albuterol (PROVENTIL HFA, VENTOLIN HFA, PROAIR HFA) 90 mcg/actuation inhaler Take 2 Puffs by inhalation every four (4) hours as needed for Wheezing. 1 Inhaler 0  
 multivitamin (ONE A DAY) tablet Take 1 Tab by mouth daily. Past History Past Medical History: 
Past Medical History:  
Diagnosis Date  Constipation  Dysphagia Past Surgical History: 
Past Surgical History:  
Procedure Laterality Date  HX GYN    
 IUD removed Family History: 
Family History Problem Relation Age of Onset  Crohn's Disease Brother Social History: 
Social History Tobacco Use  
  Smoking status: Never Smoker  Smokeless tobacco: Never Used Substance Use Topics  Alcohol use: Yes Comment: socially  Drug use: No  
 
 
Allergies: 
No Known Allergies Review of Systems Review of Systems Constitutional: Negative for chills and fever. Skin: Positive for color change and wound. All other systems reviewed and are negative. All Other Systems Negative Physical Exam  
There were no vitals filed for this visit. Physical Exam  
Constitutional: She appears well-developed and well-nourished. No distress. HENT:  
Head: Normocephalic and atraumatic. Right Ear: Hearing, tympanic membrane, external ear and ear canal normal.  
Left Ear: Hearing, tympanic membrane, external ear and ear canal normal.  
Nose: No mucosal edema. No epistaxis. No foreign bodies. Right sinus exhibits no maxillary sinus tenderness and no frontal sinus tenderness. Left sinus exhibits no maxillary sinus tenderness and no frontal sinus tenderness. Mouth/Throat: Uvula is midline, oropharynx is clear and moist and mucous membranes are normal.  
Eyes: Conjunctivae are normal.  
Neck: Normal range of motion. Neck supple. Musculoskeletal: Normal range of motion. Lymphadenopathy:  
  She has no cervical adenopathy. Neurological: She is alert. Skin: Skin is warm and dry. She is not diaphoretic. Psychiatric: She has a normal mood and affect. Diagnostic Study Results Labs - No results found for this or any previous visit (from the past 12 hour(s)). Radiologic Studies - No orders to display CT Results  (Last 48 hours) None CXR Results  (Last 48 hours) None Medical Decision Making I am the first provider for this patient. I reviewed the vital signs, available nursing notes, past medical history, past surgical history, family history and social history. Vital Signs-Reviewed the patient's vital signs. Pulse Oximetry Analysis - 100% on RA Records Reviewed: Nursing Notes Procedures: 
Procedures Provider Notes (Medical Decision Making): pt with small pustule and redness to bridge of nose. Will cover with alvaro and Roberta Kussmaul MED RECONCILIATION: 
No current facility-administered medications for this encounter. Current Outpatient Medications Medication Sig  
 folic acid 107 mcg tablet Take 800 mcg by mouth daily.  vitamin e (E GEMS) 100 unit capsule Take  by mouth daily.  cyanocobalamin (VITAMIN B12) 100 mcg tablet Take 100 mcg by mouth daily.  loratadine (CLARITIN) 10 mg tablet Take 10 mg by mouth.  Biotin 2,500 mcg cap Take  by mouth.  medroxyPROGESTERone (DEPO-PROVERA) 150 mg/mL injection 150 mg by IntraMUSCular route once.  montelukast (SINGULAIR) 10 mg tablet Take 1 Tab by mouth daily.  albuterol (PROVENTIL HFA, VENTOLIN HFA, PROAIR HFA) 90 mcg/actuation inhaler Take 2 Puffs by inhalation every four (4) hours as needed for Wheezing.  multivitamin (ONE A DAY) tablet Take 1 Tab by mouth daily. Disposition: 
home DISCHARGE NOTE:  
 
Pt has been reexamined. Patient has no new complaints, changes, or physical findings. Care plan outlined and precautions discussed. All medications were reviewed with the patient; will d/c home with keflex and bactroban. All of pt's questions and concerns were addressed. Patient was instructed and agrees to follow up with pcp, as well as to return to the ED upon further deterioration. Patient is ready to go home. Follow-up Information None Current Discharge Medication List  
  
 
 
 
 
Diagnosis Clinical Impression: No diagnosis found.

## 2019-05-01 ENCOUNTER — OFFICE VISIT (OUTPATIENT)
Dept: SURGERY | Age: 29
End: 2019-05-01

## 2019-05-01 VITALS
WEIGHT: 154 LBS | DIASTOLIC BLOOD PRESSURE: 62 MMHG | BODY MASS INDEX: 23.34 KG/M2 | RESPIRATION RATE: 16 BRPM | TEMPERATURE: 98.9 F | HEART RATE: 77 BPM | OXYGEN SATURATION: 99 % | SYSTOLIC BLOOD PRESSURE: 116 MMHG | HEIGHT: 68 IN

## 2019-05-01 DIAGNOSIS — K43.2 INCISIONAL HERNIA, WITHOUT OBSTRUCTION OR GANGRENE: Primary | ICD-10-CM

## 2019-05-01 NOTE — PROGRESS NOTES
Review of Systems   Constitutional: Positive for diaphoresis and fever. Negative for chills, malaise/fatigue and weight loss. Night sweats   HENT: Negative. Eyes: Negative. Respiratory: Negative. Cardiovascular: Negative. Gastrointestinal: Positive for constipation. Negative for abdominal pain, blood in stool, diarrhea, heartburn, melena, nausea and vomiting. Genitourinary: Negative. Musculoskeletal: Negative. Skin: Negative. Neurological: Positive for headaches. Negative for dizziness, tingling, tremors, sensory change, speech change, focal weakness, seizures, loss of consciousness and weakness. Endo/Heme/Allergies: Negative. Psychiatric/Behavioral: Positive for memory loss. Negative for depression, hallucinations, substance abuse and suicidal ideas. The patient is not nervous/anxious and does not have insomnia.

## 2019-05-02 NOTE — PROGRESS NOTES
[facility]  General Surgery    Subjective:      HPI:  Pt is a very pleasant 75-year-old female with a past medical history remarkable for Bell's palsy, dysphagia, constipation and diagnostic laparoscopy to retrieve and intrauterine device. She is referred by Dr. David Kamara for evaluation and management of an umbilical hernia. Patient states that she noticed a hernia about 2 to 3 years ago. She now feels a bulge at the site. She would like to have the ability to work out more aggressively. She is concerned that if she starts to do this the hernia will get bigger or more painful. Patient Active Problem List    Diagnosis Date Noted    Normal pregnancy, repeat 07/16/2013     Past Medical History:   Diagnosis Date    Bell's palsy     Constipation     Dysphagia     Umbilical hernia       Past Surgical History:   Procedure Laterality Date    HX GYN      IUD removed      Family History   Problem Relation Age of Onset    Crohn's Disease Brother       Social History     Tobacco Use    Smoking status: Never Smoker    Smokeless tobacco: Never Used   Substance Use Topics    Alcohol use: Yes     Comment: socially      No Known Allergies    Prior to Admission medications    Medication Sig Start Date End Date Taking? Authorizing Provider   ergocalciferol, vitamin D2, (VITAMIN D2 PO) Take  by mouth. Yes Provider, Historical   cephALEXin (KEFLEX) 500 mg capsule Take 1 Cap by mouth three (3) times daily for 10 days. 4/29/19 5/9/19 Yes Monse Mock PA   mupirocin calcium (BACTROBAN) 2 % topical cream Apply  to affected area two (2) times a day. 4/29/19  Yes Lanny Mock PA   folic acid 634 mcg tablet Take 800 mcg by mouth daily. Yes Provider, Historical   vitamin e (E GEMS) 100 unit capsule Take  by mouth daily. Yes Provider, Historical   cyanocobalamin (VITAMIN B12) 100 mcg tablet Take 100 mcg by mouth daily.    Yes Provider, Historical   loratadine (CLARITIN) 10 mg tablet Take 10 mg by mouth.   Yes Provider, Historical   Biotin 2,500 mcg cap Take  by mouth. Yes Provider, Historical   medroxyPROGESTERone (DEPO-PROVERA) 150 mg/mL injection 150 mg by IntraMUSCular route once. Yes Provider, Historical   montelukast (SINGULAIR) 10 mg tablet Take 1 Tab by mouth daily. 4/8/19  Yes Sara Ag MD   albuterol (PROVENTIL HFA, VENTOLIN HFA, PROAIR HFA) 90 mcg/actuation inhaler Take 2 Puffs by inhalation every four (4) hours as needed for Wheezing. 4/8/19  Yes Sara Ag MD   multivitamin (ONE A DAY) tablet Take 1 Tab by mouth daily. Yes Other, MD Patrice       Review of Systems:    14 systems were reviewed. The results are as above in the HPI and otherwise negative. Objective:     Vitals:    05/01/19 1457   BP: 116/62   Pulse: 77   Resp: 16   Temp: 98.9 °F (37.2 °C)   TempSrc: Oral   SpO2: 99%   Weight: 69.9 kg (154 lb)   Height: 5' 8\" (1.727 m)       Physical Exam:  GENERAL: alert, cooperative, no distress, appears stated age,   EYE: conjunctivae/corneas clear. PERRL, EOM's intact. THROAT & NECK: normal and no erythema or exudates noted. ,    LYMPHATIC: Cervical, supraclavicular, and axillary nodes normal. ,   LUNG: clear to auscultation bilaterally,   HEART: regular rate and rhythm, S1, S2 normal, no murmur, click, rub or gallop,   ABDOMEN: soft, non-distended, tender at reducible incisional hernia at the umbilicus. Bowel sounds normal. No masses,  no organomegaly,   EXTREMITIES:  extremities normal, atraumatic, no cyanosis or edema,   SKIN: Normal.,   NEUROLOGIC: AOx3. Cranial nerves 2-12 and sensation grossly intact. ,     Data Review:  to be done    Ms. Jack has a reminder for a \"due or due soon\" health maintenance. I have asked that she contact her primary care provider for follow-up on this health maintenance. Impression:     · Patient with a reducible incisional hernia at the umbilicus.     Plan:     · Patient will return to see us when she has decided if she wants to proceed with robot-assisted laparoscopic cholecystectomy.     Signed By: Danuta Callahan MD     May 2, 2019

## 2019-05-08 ENCOUNTER — OFFICE VISIT (OUTPATIENT)
Dept: FAMILY MEDICINE CLINIC | Age: 29
End: 2019-05-08

## 2019-05-08 VITALS
BODY MASS INDEX: 23.43 KG/M2 | WEIGHT: 154.6 LBS | DIASTOLIC BLOOD PRESSURE: 60 MMHG | TEMPERATURE: 99 F | SYSTOLIC BLOOD PRESSURE: 104 MMHG | HEIGHT: 68 IN | HEART RATE: 74 BPM | OXYGEN SATURATION: 99 % | RESPIRATION RATE: 16 BRPM

## 2019-05-08 DIAGNOSIS — Z88.9 H/O SEASONAL ALLERGIES: ICD-10-CM

## 2019-05-08 DIAGNOSIS — R05.9 COUGH: ICD-10-CM

## 2019-05-08 DIAGNOSIS — L08.9 SKIN INFECTION: Primary | ICD-10-CM

## 2019-05-08 RX ORDER — MONTELUKAST SODIUM 10 MG/1
10 TABLET ORAL DAILY
Qty: 30 TAB | Refills: 1 | Status: SHIPPED | OUTPATIENT
Start: 2019-05-08 | End: 2022-01-31

## 2019-05-08 NOTE — PATIENT INSTRUCTIONS
Impetigo: Care Instructions  Your Care Instructions  Impetigo (say \"[id number]\") is a skin infection caused by bacteria. It causes blisters that break and become oozing, yellow, crusty sores. Impetigo can be anywhere on the body. Scratching the sores may spread the infection to other parts of the body. You can also spread it to others through close contact or when you share towels, clothing, and other items. Prescription antibiotic ointment or pills can usually cure impetigo. (After a day of antibiotics, the infection should not spread.)  Follow-up care is a key part of your treatment and safety. Be sure to make and go to all appointments, and call your doctor if you are having problems. It's also a good idea to know your test results and keep a list of the medicines you take. How can you care for yourself at home? · Apply antibiotic ointment exactly as instructed. · If your doctor prescribed antibiotic pills, take them as directed. Do not stop using them just because you feel better. You need to take the full course of antibiotics. · Gently wash the sores with soap and water each day. If crusts form, your doctor may advise you to soften or remove the crusts. You can do this by soaking them in warm water and patting them dry. This can help the cream or ointment treat impetigo. · After you touch the area, wash your hands with soap and water. Or you can use an alcohol-based hand . · Don't share items such as towels, sheets, and clothing until the infection is gone. · Wash anything that may have touched the infected area. · Try to avoid scratching the area. When should you call for help? Call your doctor now or seek immediate medical care if:    · You have symptoms of a worse infection, such as:  ? Increased pain, swelling, warmth, or redness. ? Red streaks leading from the area. ? Pus draining from the area.   ? A fever.     · Impetigo gets worse or spreads to other areas.    Watch closely for changes in your health, and be sure to contact your doctor if:    · You do not get better as expected. Where can you learn more? Go to http://tanner-paul.info/. Enter M770 in the search box to learn more about \"Impetigo: Care Instructions. \"  Current as of: March 27, 2018  Content Version: 11.9  © 9918-8186 [employer]. Care instructions adapted under license by [employer] (which disclaims liability or warranty for this information). If you have questions about a medical condition or this instruction, always ask your healthcare professional. [address] any warranty or liability for your use of this information.

## 2019-05-08 NOTE — PROGRESS NOTES
HISTORY OF PRESENT ILLNESS  Erendira Haas is a 30 y.o. female. HPI: Here with sore over her bridge of the nose. Started couple of weeks ago and was more painful and started draining pus so went to urgent care. Was given Bactroban and keflex. No side effects. Completed 7 days. Originally was given for 7 days. Now it does look erythematous but no drainage. Mild discomfort but no obvious tenderness. Noted granulation tissue at the base. No fever. No side effects from antibiotic. No abdominal pain. No nausea or vomiting. No other skin lesion. Visit Vitals  /60 (BP 1 Location: Left arm, BP Patient Position: Sitting)   Pulse 74   Temp 99 °F (37.2 °C) (Oral)   Resp 16   Ht 5' 8\" (1.727 m)   Wt 154 lb 9.6 oz (70.1 kg)   SpO2 99%   BMI 23.51 kg/m²     Sitting comfortable. ROS: see HPI     Physical Exam   Constitutional: She is oriented to person, place, and time. No distress. Neurological: She is oriented to person, place, and time. Skin:        erythematous open sore over bridge of the nose. Granulation tissue noted at he base, no discharge. Non tender on touch. Surrounding skin no swelling or erythematous. Approximately 0.5 cm in size, circular ,.        ASSESSMENT and PLAN    ICD-10-CM ICD-9-CM    1. Skin infection: looks like impetigo. Done with 7 days of keflex and Bactroban. For now observe . Advised to keep area clean. Avoid any bandage . Will f/u next week or sooner if gets worse. L08.9 686.9    2. Cough; stable. R05 786.2 montelukast (SINGULAIR) 10 mg tablet   3. H/O seasonal allergies; fairly stable on symptomatic treatment. Given medication refill. Z88.9 V15.09 montelukast (SINGULAIR) 10 mg tablet   Pt understood and agree with the plan     Follow-up and Dispositions    · Return in about 1 week (around 5/15/2019).

## 2019-05-08 NOTE — PROGRESS NOTES
1. Have you been to the ER, urgent care clinic since your last visit? Hospitalized since your last visit? [facility] 4/29/19    2. Have you seen or consulted any other health care providers outside of the [address] [place] [address] since your last visit? Include any pap smears or colon screening.  No    Chief Complaint   Patient presents with    Skin Problem     sore on nose - scabs and then gets pus under it then scab comes off - [facility] 4/29/19    Medication Evaluation     wants to discuss getting Rx for triamcinolone for eczema

## 2019-10-08 ENCOUNTER — APPOINTMENT (OUTPATIENT)
Dept: ULTRASOUND IMAGING | Age: 29
End: 2019-10-08
Attending: PHYSICIAN ASSISTANT
Payer: MEDICAID

## 2019-10-08 ENCOUNTER — HOSPITAL ENCOUNTER (EMERGENCY)
Age: 29
Discharge: HOME | End: 2019-10-08
Attending: EMERGENCY MEDICINE
Payer: MEDICAID

## 2019-10-08 VITALS
SYSTOLIC BLOOD PRESSURE: 115 MMHG | OXYGEN SATURATION: 100 % | HEIGHT: 66 IN | WEIGHT: 150 LBS | RESPIRATION RATE: 14 BRPM | HEART RATE: 65 BPM | DIASTOLIC BLOOD PRESSURE: 75 MMHG | BODY MASS INDEX: 24.11 KG/M2 | TEMPERATURE: 98.4 F

## 2019-10-08 DIAGNOSIS — N83.202 LEFT OVARIAN CYST: Primary | ICD-10-CM

## 2019-10-08 LAB
APPEARANCE UR: CLEAR
BILIRUB UR QL: NEGATIVE
COLOR UR: YELLOW
EPITH CASTS URNS QL MICRO: NORMAL /LPF (ref 0–5)
GLUCOSE UR STRIP.AUTO-MCNC: NEGATIVE MG/DL
HCG UR QL: NEGATIVE
HGB UR QL STRIP: ABNORMAL
KETONES UR QL STRIP.AUTO: NEGATIVE MG/DL
LEUKOCYTE ESTERASE UR QL STRIP.AUTO: NEGATIVE
NITRITE UR QL STRIP.AUTO: NEGATIVE
PH UR STRIP: 8 [PH] (ref 5–8)
PROT UR STRIP-MCNC: NEGATIVE MG/DL
RBC #/AREA URNS HPF: NORMAL /HPF (ref 0–5)
SP GR UR REFRACTOMETRY: 1.02 (ref 1–1.03)
UROBILINOGEN UR QL STRIP.AUTO: 1 EU/DL (ref 0.2–1)
WBC URNS QL MICRO: NORMAL /HPF (ref 0–4)

## 2019-10-08 PROCEDURE — 76830 TRANSVAGINAL US NON-OB: CPT

## 2019-10-08 PROCEDURE — 81001 URINALYSIS AUTO W/SCOPE: CPT

## 2019-10-08 PROCEDURE — 81025 URINE PREGNANCY TEST: CPT

## 2019-10-08 PROCEDURE — 99283 EMERGENCY DEPT VISIT LOW MDM: CPT

## 2019-10-08 NOTE — ED TRIAGE NOTES
Left sided pelvic pain x 2 weeks. Was seen at urgent care Saturday and states \"they felt a swollen lymph node\" on left side and recommended US, but GYN unable to see her until next week. States pain and bleeding with intercourse.

## 2019-10-08 NOTE — ED PROVIDER NOTES
EMERGENCY DEPARTMENT HISTORY AND PHYSICAL EXAM    Date: 10/8/2019  Patient Name: Marylen Hillock    History of Presenting Illness     Chief Complaint   Patient presents with    Pelvic Pain         History Provided By: Patient        Additional History (Context): Marylen Hillock is a 30 y.o. female with History of taking Depo-Provera for birth control who presents with a complaint of left-sided pelvic pressure with mild discomfort intermittently over the last 2 weeks. Patient is currently on Depo-Provera however her subsequent shot is 2 weeks late at this point. Patient is sexually active, however she has no concerns for STD. Patient denies dysuria, hematuria or flank pain. No malodorous vaginal discharge. Patient has not seen her prior primary care or OB/GYN prior provider for this complaint, closest appointment was 1 month from now with her OB/GYN. Patient reports her concern is length of symptoms of 2 weeks. She was not able to get an appointment with her OB/GYN for several weeks. Patient denies fever, myalgias or chills. PCP: Ofelia Lambert MD    Current Outpatient Medications   Medication Sig Dispense Refill    cholecalciferol, vitamin D3, (VITAMIN D3 PO) Take  by mouth.  montelukast (SINGULAIR) 10 mg tablet Take 1 Tab by mouth daily. 30 Tab 1    ergocalciferol, vitamin D2, (VITAMIN D2 PO) Take  by mouth.  mupirocin calcium (BACTROBAN) 2 % topical cream Apply  to affected area two (2) times a day. 15 g 0    folic acid 785 mcg tablet Take 800 mcg by mouth daily.  vitamin e (E GEMS) 100 unit capsule Take  by mouth daily.  cyanocobalamin (VITAMIN B12) 100 mcg tablet Take 100 mcg by mouth daily.  loratadine (CLARITIN) 10 mg tablet Take 10 mg by mouth daily.  Biotin 2,500 mcg cap Take  by mouth.  medroxyPROGESTERone (DEPO-PROVERA) 150 mg/mL injection 150 mg by IntraMUSCular route once.       albuterol (PROVENTIL HFA, VENTOLIN HFA, PROAIR HFA) 90 mcg/actuation inhaler Take 2 Puffs by inhalation every four (4) hours as needed for Wheezing. 1 Inhaler 0    multivitamin (ONE A DAY) tablet Take 1 Tab by mouth daily. Past History     Past Medical History:  Past Medical History:   Diagnosis Date    Bell's palsy     Constipation     Dysphagia     Umbilical hernia        Past Surgical History:  Past Surgical History:   Procedure Laterality Date    HX GYN      IUD removed       Family History:  Family History   Problem Relation Age of Onset    Crohn's Disease Brother        Social History:  Social History     Tobacco Use    Smoking status: Never Smoker    Smokeless tobacco: Never Used   Substance Use Topics    Alcohol use: Yes     Comment: socially    Drug use: No       Allergies:  No Known Allergies      Review of Systems   Review of Systems  Review of Systems   Constitutional: Negative for fatigue and fever. HENT: Negative for congestion. Respiratory: Negative for cough and shortness of breath. Cardiovascular: Negative for chest pain. Gastrointestinal: Negative for abdominal pain, diarrhea, nausea and vomiting. Mild to moderate discomfort left pelvic region x2 weeks. Genitourinary: Negative for difficulty urinating and dysuria. Musculoskeletal: Negative joint pain, joint swelling, recent injury. Skin: Negative for wound. Neurological: Negative for dizziness and headaches. All other systems reviewed and are negative. All Other Systems Negative  Physical Exam     Vitals:    10/08/19 1218   BP: 123/77   Pulse: 73   Resp: 18   Temp: 98.4 °F (36.9 °C)   SpO2: 100%   Weight: 68 kg (150 lb)   Height: 5' 6\" (1.676 m)     Physical Exam     Constitutional: Pt is oriented to person, place, and time. Pt appears well-developed and well-nourished. HENT:   Head: Normocephalic and atraumatic. Mouth/Throat: Oropharynx is clear and moist.   Eyes: Pupils are equal, round, and reactive to light. Neck: Normal range of motion. Neck supple. Cardiovascular: Normal rate, regular rhythm and normal heart sounds. No murmur heard. Pulmonary/Chest: Effort normal and breath sounds normal. No respiratory distress. No wheezes or rales. Abdominal: Soft. no distension and no mass. There is no tenderness. There is no rebound and no guarding. Musculoskeletal: Normal range of motion. No edema or deformity. Neurological: Pt is alert and oriented to person, place, and time   Skin: Skin is warm and dry. Psychiatric: Pt has a normal mood and affect;  behavior is normal. Judgment and thought content normal.           Diagnostic Study Results     Labs -     Recent Results (from the past 12 hour(s))   URINALYSIS W/ RFLX MICROSCOPIC    Collection Time: 10/08/19 12:15 PM   Result Value Ref Range    Color YELLOW      Appearance CLEAR      Specific gravity 1.021 1.005 - 1.030      pH (UA) 8.0 5.0 - 8.0      Protein NEGATIVE  NEG mg/dL    Glucose NEGATIVE  NEG mg/dL    Ketone NEGATIVE  NEG mg/dL    Bilirubin NEGATIVE  NEG      Blood SMALL (A) NEG      Urobilinogen 1.0 0.2 - 1.0 EU/dL    Nitrites NEGATIVE  NEG      Leukocyte Esterase NEGATIVE  NEG     HCG URINE, QL    Collection Time: 10/08/19 12:15 PM   Result Value Ref Range    HCG urine, QL NEGATIVE  NEG     URINE MICROSCOPIC ONLY    Collection Time: 10/08/19 12:15 PM   Result Value Ref Range    WBC NONE 0 - 4 /hpf    RBC 4 to 10 0 - 5 /hpf    Epithelial cells 1+ 0 - 5 /lpf       Radiologic Studies -   US TRANSVAGINAL W DOPPLER   Final Result   IMPRESSION:      1. Small two simple left ovarian cysts,, most likely physiologic cysts. 2. Nonvisualization of the right ovary due to bowel gas. CT Results  (Last 48 hours)    None        CXR Results  (Last 48 hours)    None            Medical Decision Making   I am the first provider for this patient. I reviewed the vital signs, available nursing notes, past medical history, past surgical history, family history and social history.     Vital Signs-Reviewed the patient's vital signs. Comparison:    Records Reviewed: Nursing Notes    Procedures:  Procedures    Provider Notes (Medical Decision Making):   Patient is nontoxic. Nontender to palpation of the entire abdomen. No CVA tenderness. I suspect ultrasound will fine ovarian cysts. I discussed ultrasound findings with patient. We discussed benign finding of ovarian cysts. Patient will follow-up with OB/GYN if symptoms persist.  MED RECONCILIATION:  No current facility-administered medications for this encounter. Current Outpatient Medications   Medication Sig    cholecalciferol, vitamin D3, (VITAMIN D3 PO) Take  by mouth.  montelukast (SINGULAIR) 10 mg tablet Take 1 Tab by mouth daily.  ergocalciferol, vitamin D2, (VITAMIN D2 PO) Take  by mouth.  mupirocin calcium (BACTROBAN) 2 % topical cream Apply  to affected area two (2) times a day.  folic acid 274 mcg tablet Take 800 mcg by mouth daily.  vitamin e (E GEMS) 100 unit capsule Take  by mouth daily.  cyanocobalamin (VITAMIN B12) 100 mcg tablet Take 100 mcg by mouth daily.  loratadine (CLARITIN) 10 mg tablet Take 10 mg by mouth daily.  Biotin 2,500 mcg cap Take  by mouth.  medroxyPROGESTERone (DEPO-PROVERA) 150 mg/mL injection 150 mg by IntraMUSCular route once.  albuterol (PROVENTIL HFA, VENTOLIN HFA, PROAIR HFA) 90 mcg/actuation inhaler Take 2 Puffs by inhalation every four (4) hours as needed for Wheezing.  multivitamin (ONE A DAY) tablet Take 1 Tab by mouth daily. Disposition:  Home    DISCHARGE NOTE:     Pt has been reexamined. Patient has no new complaints, changes, or physical findings. Care plan outlined and precautions discussed. Results of exam, ultrasound and labs were reviewed with the patient. All medications were reviewed with the patient; will d/c home with follow up instructions. All of pt's questions and concerns were addressed.  Patient was instructed and agrees to follow up with OB/Gyn if symptoms persist. as well as to return to the ED upon further deterioration. Patient is ready to go home. Follow-up Information     Follow up With Specialties Details Why Contact Info    Nora Sotelo MD [facility] In 2 days  [address] [place], [place] [id number] [address]      [id number] [place] [address] EMERGENCY DEPT Emergency Medicine  If symptoms worsen [address] [facility]  [phone]          Current Discharge Medication List              Diagnosis     Clinical Impression:   1.  Left ovarian cyst

## 2019-10-08 NOTE — DISCHARGE INSTRUCTIONS
Patient Education        Hemorrhagic Ovarian Cyst: Care Instructions  Your Care Instructions    Sometimes a sac forms on the surface of a woman's ovary. When the sac swells up with fluid, it forms a cyst. If the cyst bleeds, it is called a hemorrhagic (say \"Salvatore\") ovarian cyst. If the cyst breaks open, blood and fluid spill out into the lower belly and pelvis. You may not have symptoms from the cyst. But if it is large, or if it twists or breaks open, you may have pain or other problems. You may feel pain from the cyst or have symptoms from losing blood. Your doctor may use a pelvic ultrasound to see if you have a cyst. Blood tests can help your doctor tell if the cyst is bleeding or you have lost a lot of blood. Treatment depends on your symptoms. If they are mild, your doctor may suggest carefully watching your symptoms and doing blood tests again. But if you have a cyst that is very large, bleeds a lot, or causes other problems, your doctor may suggest surgery to remove it. If the bleeding is heavy, you may also need treatment to replace the blood. Follow-up care is a key part of your treatment and safety. Be sure to make and go to all appointments, and call your doctor if you are having problems. It's also a good idea to know your test results and keep a list of the medicines you take. How can you care for yourself at home? · Use heat, such as a warm water bottle, a heating pad set on low, or a warm bath, to relax tense muscles and relieve cramping. · Be safe with medicines. Read and follow all instructions on the label. ? If the doctor gave you a prescription medicine for pain, take it as prescribed. ? If you are not taking a prescription pain medicine, ask your doctor if you can take an over-the-counter medicine. When should you call for help? Call 911 anytime you think you may need emergency care.  For example, call if:    · You passed out (lost consciousness).    Call your doctor now or seek immediate medical care if:    · You have severe vaginal bleeding.     · You are dizzy or lightheaded, or you feel like you may faint.     · You have new or worse pain in your belly or pelvis.    Watch closely for changes in your health, and be sure to contact your doctor if:    · You think you may be pregnant.     · You do not get better as expected. Where can you learn more? Go to http://tanner-paul.info/. Enter D256 in the search box to learn more about \"Hemorrhagic Ovarian Cyst: Care Instructions. \"  Current as of: February 19, 2019  Content Version: 12.2  © 1723-1820 [employer]. Care instructions adapted under license by [employer] (which disclaims liability or warranty for this information). If you have questions about a medical condition or this instruction, always ask your healthcare professional. [address] any warranty or liability for your use of this information.

## 2019-10-09 ENCOUNTER — OFFICE VISIT (OUTPATIENT)
Dept: FAMILY MEDICINE CLINIC | Age: 29
End: 2019-10-09

## 2020-01-13 ENCOUNTER — OFFICE VISIT (OUTPATIENT)
Dept: FAMILY MEDICINE CLINIC | Age: 30
End: 2020-01-13

## 2020-01-13 VITALS
DIASTOLIC BLOOD PRESSURE: 70 MMHG | BODY MASS INDEX: 25.3 KG/M2 | RESPIRATION RATE: 16 BRPM | SYSTOLIC BLOOD PRESSURE: 104 MMHG | HEART RATE: 78 BPM | HEIGHT: 66 IN | OXYGEN SATURATION: 98 % | TEMPERATURE: 99.4 F | WEIGHT: 157.4 LBS

## 2020-01-13 DIAGNOSIS — R10.2 PELVIC PAIN: Primary | ICD-10-CM

## 2020-01-13 DIAGNOSIS — K42.9 UMBILICAL HERNIA WITHOUT OBSTRUCTION AND WITHOUT GANGRENE: ICD-10-CM

## 2020-01-13 DIAGNOSIS — L98.9 SKIN LESION: ICD-10-CM

## 2020-01-13 DIAGNOSIS — L30.9 ECZEMA, UNSPECIFIED TYPE: ICD-10-CM

## 2020-01-13 RX ORDER — TRIAMCINOLONE ACETONIDE 1 MG/G
CREAM TOPICAL
COMMUNITY
Start: 2019-12-06 | End: 2020-01-13 | Stop reason: SDUPTHER

## 2020-01-13 RX ORDER — TRIAMCINOLONE ACETONIDE 1 MG/G
CREAM TOPICAL
Qty: 15 G | Refills: 1 | Status: SHIPPED | OUTPATIENT
Start: 2020-01-13 | End: 2020-07-13 | Stop reason: ALTCHOICE

## 2020-01-13 RX ORDER — MUPIROCIN 20 MG/G
OINTMENT TOPICAL
COMMUNITY
Start: 2019-12-26 | End: 2020-03-09

## 2020-01-13 RX ORDER — VALACYCLOVIR HYDROCHLORIDE 1 G/1
TABLET, FILM COATED ORAL
COMMUNITY
Start: 2020-01-09 | End: 2022-01-31

## 2020-01-13 NOTE — PROGRESS NOTES
HISTORY OF PRESENT ILLNESS  Adriano Smith is a 30 y.o. female. HPI: Here for follow up. Last ER visit on oct 2019 and was for pelvic pain. It has been resolved. Diagnosed with physiological cyst.   Had pap done June 2019 . Will obtain records. No vaginal discharge or abdominal pain at this time. Vitals stable. Denies any headache, dizziness, no chest pain or trouble breathing, no arm or leg weakness. No nausea or vomiting, no weight or appetite changes, no mood changes . No urine or bowel complains, no palpitation, no diaphoresis. No abdominal pain. No cold or cough. No leg swelling. No fever. No sleep trouble. Visit Vitals  /70 (BP 1 Location: Left arm, BP Patient Position: Sitting)   Pulse 78   Temp 99.4 °F (37.4 °C) (Oral)   Resp 16   Ht 5' 6\" (1.676 m)   Wt 157 lb 6.4 oz (71.4 kg)   SpO2 98%   BMI 25.41 kg/m²     Has umbilical hernia. Reducible. No pain. Seen surgeon and will observe. If gets worse will decide regarding surgery. Currently noted lesion over nose. Seen dermatology and had biopsy done. 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 She was given valtrex. She was asking if further testing can be done. I have advised her to ask dermatology in detail any test done at their office. Mean time have f/u with them. Will try to obtain records. Also asking medication as needed for eczema mainly over her hands. Over wrist.   No open skin. Mild dry skin at this time over left wrist area. No itching or pain. Given topical cream to use as needed. ROS: see HPI     Physical Exam  Constitutional:       General: She is not in acute distress. Neck:      Comments: No palpable enlarge thyroid gland. Cardiovascular:      Rate and Rhythm: Normal rate and regular rhythm. Heart sounds: Normal heart sounds. Abdominal:      General: Bowel sounds are normal.      Palpations: Abdomen is soft. Tenderness: There is no tenderness. Skin:     Comments: Ulcer over bridge of the nose. 2mm in size. Oval. Superficial. No discharge. Non tender. Dry skin over left wrist. No open skin or any tenderness or itching. No erythema. Neurological:      Mental Status: She is oriented to person, place, and time. Psychiatric:         Behavior: Behavior normal.         ASSESSMENT and PLAN    ICD-10-CM ICD-9-CM    1. Pelvic pain: resolved. Seen ob/gyn. Up to date with pap done in June 2019. Will obtain records. R10.2 [id number]     ultasound during Er showed physiological cyst left ovarie. 2. Umbilical hernia without obstruction and without gangrene: for now observe. If gets worse will decide about surgery. K42.9 553.1     seen surgeon. 3. Skin lesion: initially was treated for infection with doxycyclin by dermatology. 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 L98.9 709.9     ulcer over nose . seen dermatology . had biopsy . noted HSV 1-2. given valtrex    4. Eczema, unspecified type: symptomatic treatment. L30.9 692.9    Pt understood and agree with the plan   Review HM   Follow-up and Dispositions    · Return in about 6 months (around 7/13/2020).

## 2020-01-13 NOTE — PATIENT INSTRUCTIONS
A Healthy Lifestyle: Care Instructions  Your Care Instructions    A healthy lifestyle can help you feel good, stay at a healthy weight, and have plenty of energy for both work and play. A healthy lifestyle is something you can share with your whole family. A healthy lifestyle also can lower your risk for serious health problems, such as high blood pressure, heart disease, and diabetes. You can follow a few steps listed below to improve your health and the health of your family. Follow-up care is a key part of your treatment and safety. Be sure to make and go to all appointments, and call your doctor if you are having problems. It's also a good idea to know your test results and keep a list of the medicines you take. How can you care for yourself at home? · Do not eat too much sugar, fat, or fast foods. You can still have dessert and treats now and then. The goal is moderation. · Start small to improve your eating habits. Pay attention to portion sizes, drink less juice and soda pop, and eat more fruits and vegetables. ? Eat a healthy amount of food. A 3-ounce serving of meat, for example, is about the size of a deck of cards. Fill the rest of your plate with vegetables and whole grains. ? Limit the amount of soda and sports drinks you have every day. Drink more water when you are thirsty. ? Eat at least 5 servings of fruits and vegetables every day. It may seem like a lot, but it is not hard to reach this goal. A serving or helping is 1 piece of fruit, 1 cup of vegetables, or 2 cups of leafy, raw vegetables. Have an apple or some carrot sticks as an afternoon snack instead of a candy bar. Try to have fruits and/or vegetables at every meal.  · Make exercise part of your daily routine. You may want to start with simple activities, such as walking, bicycling, or slow swimming. Try to be active 30 to 60 minutes every day. You do not need to do all 30 to 60 minutes all at once.  For example, you can exercise 3 times a day for 10 or 20 minutes. Moderate exercise is safe for most people, but it is always a good idea to talk to your doctor before starting an exercise program.  · Keep moving. [place] [employer] the lawn, work in the garden, or imagoo. Take the stairs instead of the elevator at work. · If you smoke, quit. People who smoke have an increased risk for heart attack, stroke, cancer, and other lung illnesses. Quitting is hard, but there are ways to boost your chance of quitting tobacco for good. ? Use nicotine gum, patches, or lozenges. ? Ask your doctor about stop-smoking programs and medicines. ? Keep trying. In addition to reducing your risk of diseases in the future, you will notice some benefits soon after you stop using tobacco. If you have shortness of breath or asthma symptoms, they will likely get better within a few weeks after you quit. · Limit how much alcohol you drink. Moderate amounts of alcohol (up to 2 drinks a day for men, 1 drink a day for women) are okay. But drinking too much can lead to liver problems, high blood pressure, and other health problems. Family health  If you have a family, there are many things you can do together to improve your health. · Eat meals together as a family as often as possible. · Eat healthy foods. This includes fruits, vegetables, lean meats and dairy, and whole grains. · Include your family in your fitness plan. Most people think of activities such as jogging or tennis as the way to fitness, but there are many ways you and your family can be more active. Anything that makes you breathe hard and gets your heart pumping is exercise. Here are some tips:  ? Walk to do errands or to take your child to school or the bus.  ? Go for a family bike ride after dinner instead of watching TV. Where can you learn more? Go to http://tanner-paul.info/. Enter X891 in the search box to learn more about \"A Healthy Lifestyle: Care Instructions. \"  Current as of: May 28, 2019  Content Version: 12.2  © 9245-1931 [employer]. Care instructions adapted under license by [employer] (which disclaims liability or warranty for this information). If you have questions about a medical condition or this instruction, always ask your healthcare professional. [address] any warranty or liability for your use of this information. Cold Sores: Care Instructions  Your Care Instructions  Cold sores are clusters of small blisters on the lip and skin around or inside the mouth. Often the first sign of a cold sore is a spot that tingles, burns, or itches. A blister usually forms within 24 hours. The skin around the blisters can be red and inflamed. The blisters can break open, weep a clear fluid, and then scab over after a few days. Cold sores most often heal in 7 to 10 days without a scar. They are sometimes called fever blisters. Cold sores are caused by a virus called the herpes simplex virus. This virus also causes some cases of genital herpes. The virus can spread from a sore in the genital area to the lips. Or it can spread from a cold sore on the lips to the genital area. Cold sores most often go away on their own. But if they are severe, embarrass you, or cause pain, your doctor may prescribe antiviral medicine to relieve pain and help prevent outbreaks. Follow-up care is a key part of your treatment and safety. Be sure to make and go to all appointments, and call your doctor if you are having problems. It's also a good idea to know your test results and keep a list of the medicines you take. How can you care for yourself at home? · Wash your hands often. And try not to touch your cold sores. This will help to avoid spreading the virus to your eyes or genital area, or to other people. This is more likely to happen if this is your first cold sore outbreak. · Place ice or a cool, wet towel on the sores 3 times a day.  Do this for 20 minutes each time. It may help to reduce redness and swelling. · If you are just getting a cold sore, try over-the-counter docosanol (Abreva) cream to reduce symptoms. · If your doctor prescribed antiviral medicine to relieve pain and help prevent outbreaks, be sure to follow the directions. · Take an over-the-counter pain medicine, such as acetaminophen (Tylenol), ibuprofen (Advil, Motrin), or naproxen (Aleve), as needed. Read and follow all instructions on the label. · Do not take two or more pain medicines at the same time unless the doctor told you to. Many pain medicines have acetaminophen, which is Tylenol. Too much acetaminophen (Tylenol) can be harmful. · Avoid citrus fruit, tomatoes, and other foods that contain acid. · Use over-the-counter ointments to numb sore areas in the mouth or on the lips. These include Orajel and Anbesol. · Do not kiss or have oral sex with anyone while you have a cold sore. To prevent cold sores in the future  · Avoid long exposure of your lips to the sun. (Wear a hat to help shade your mouth.)  · Do not kiss or have oral sex with someone who has a cold sore. Do not have sex or oral sex with someone who has a genital herpes outbreak. · Using lip balm that contains sunscreen may help reduce outbreaks of cold sores. · Do not share towels, razors, silverware, toothbrushes, or other objects with a person who has a cold sore. When should you call for help? Call your doctor now or seek immediate medical care if:    · Your symptoms are painful and you want to try antiviral medicine.     · You have signs of infection, such as:  ? Increased pain, swelling, warmth, or redness. ? Red streaks leading from a cold sore. ? Pus draining from a cold sore.   ? A fever.     · You have a cold sore and develop eye pain, eye discharge, or any changes in your vision.    Watch closely for changes in your health, and be sure to contact your doctor if:    · The cold sore does not heal in 7 to 10 days.     · You get cold sores often. Where can you learn more? Go to http://tanner-paul.info/. Enter R267 in the search box to learn more about \"Cold Sores: Care Instructions. \"  Current as of: September 11, 2018  Content Version: 12.2  © 3145-5900 [employer]. Care instructions adapted under license by [employer] (which disclaims liability or warranty for this information). If you have questions about a medical condition or this instruction, always ask your healthcare professional. [place] [address] any warranty or liability for your use of this information. Dermatitis: Care Instructions  Your Care Instructions  Dermatitis is the general name used for any rash or inflammation of the skin. Different kinds of dermatitis cause different kinds of rashes. Common causes of a rash include new medicines, plants (such as poison oak or poison ivy), heat, and stress. Certain illnesses can also cause a rash. An allergic reaction to something that touches your skin, such as latex, nickel, or poison ivy, is called contact dermatitis. Contact dermatitis may also be caused by something that irritates the skin, such as bleach, a chemical, or soap. These types of rashes cannot be spread from person to person. How long your rash will last depends on what caused it. Rashes may last a few days or months. Follow-up care is a key part of your treatment and safety. Be sure to make and go to all appointments, and call your doctor if you are having problems. It's also a good idea to know your test results and keep a list of the medicines you take. How can you care for yourself at home? · Do not scratch the rash. Cut your nails short, and file them smooth. Or wear gloves if this helps keep you from scratching. · Wash the area with water only. Pat dry. · Put cold, wet cloths on the rash to reduce itching.   · Keep cool, and stay out of the sun.  · Leave the rash open to the air as much as possible. · If the rash itches, use hydrocortisone cream. Follow the directions on the label. Calamine lotion may help for plant rashes. · Take an over-the-counter antihistamine, such as diphenhydramine (Benadryl) or loratadine (Claritin), to help calm the itching. Read and follow all instructions on the label. · If your doctor prescribed a cream, use it as directed. If your doctor prescribed medicine, take it exactly as directed. When should you call for help? Call your doctor now or seek immediate medical care if:    · You have symptoms of infection, such as:  ? Increased pain, swelling, warmth, or redness. ? Red streaks leading from the area. ? Pus draining from the area. ? A fever.     · You have joint pain along with the rash.    Watch closely for changes in your health, and be sure to contact your doctor if:    · Your rash is changing or getting worse.     · You are not getting better as expected. Where can you learn more? Go to http://tanner-paul.info/. Enter [phone] [id number] in the search box to learn more about \"Dermatitis: Care Instructions. \"  Current as of: April 1, 2019  Content Version: 12.2  © 4500-8458 [employer]. Care instructions adapted under license by [employer] (which disclaims liability or warranty for this information). If you have questions about a medical condition or this instruction, always ask your healthcare professional. [place] [address] any warranty or liability for your use of this information.

## 2020-01-13 NOTE — PROGRESS NOTES
1. Have you been to the ER, urgent care clinic since your last visit? Hospitalized since your last visit? [facility] 10/08/2019    2. Have you seen or consulted any other health care providers outside of the [address] [place] [address] since your last visit? Include any pap smears or colon screening. [facility] in [address] [place] [address] St: 12/26/19. Chief Complaint   Patient presents with    Lesion     lesion on nose - told HSV 1-2    [facility] Follow Up     [facility] 10/08/19 - wants to get referral to gastroenterology    Medication Refill     wants Rx for Triamcinolone Ointment         Last pap smear - Yusra Barnett, NP 6/2019  Patient declined flu vaccine.

## 2020-03-09 ENCOUNTER — HOSPITAL ENCOUNTER (OUTPATIENT)
Dept: LAB | Age: 30
Discharge: HOME | End: 2020-03-09
Payer: MEDICAID

## 2020-03-09 ENCOUNTER — OFFICE VISIT (OUTPATIENT)
Dept: FAMILY MEDICINE CLINIC | Age: 30
End: 2020-03-09

## 2020-03-09 ENCOUNTER — HOSPITAL ENCOUNTER (OUTPATIENT)
Dept: GENERAL RADIOLOGY | Age: 30
Discharge: HOME | End: 2020-03-09
Payer: MEDICAID

## 2020-03-09 VITALS
DIASTOLIC BLOOD PRESSURE: 62 MMHG | OXYGEN SATURATION: 98 % | BODY MASS INDEX: 24.59 KG/M2 | HEIGHT: 66 IN | TEMPERATURE: 98.7 F | WEIGHT: 153 LBS | RESPIRATION RATE: 14 BRPM | SYSTOLIC BLOOD PRESSURE: 96 MMHG | HEART RATE: 85 BPM

## 2020-03-09 DIAGNOSIS — M25.551 PAIN OF BOTH HIP JOINTS: ICD-10-CM

## 2020-03-09 DIAGNOSIS — M25.511 CHRONIC RIGHT SHOULDER PAIN: ICD-10-CM

## 2020-03-09 DIAGNOSIS — G89.29 CHRONIC LEFT SHOULDER PAIN: ICD-10-CM

## 2020-03-09 DIAGNOSIS — E55.9 VITAMIN D DEFICIENCY: ICD-10-CM

## 2020-03-09 DIAGNOSIS — M25.50 ARTHRALGIA, UNSPECIFIED JOINT: ICD-10-CM

## 2020-03-09 DIAGNOSIS — M25.512 CHRONIC LEFT SHOULDER PAIN: ICD-10-CM

## 2020-03-09 DIAGNOSIS — M25.552 PAIN OF BOTH HIP JOINTS: ICD-10-CM

## 2020-03-09 DIAGNOSIS — G89.29 CHRONIC RIGHT SHOULDER PAIN: ICD-10-CM

## 2020-03-09 DIAGNOSIS — M25.50 ARTHRALGIA, UNSPECIFIED JOINT: Primary | ICD-10-CM

## 2020-03-09 LAB — XX-LABCORP SPECIMEN COL,LCBCF: NORMAL

## 2020-03-09 PROCEDURE — 73030 X-RAY EXAM OF SHOULDER: CPT

## 2020-03-09 PROCEDURE — 73521 X-RAY EXAM HIPS BI 2 VIEWS: CPT

## 2020-03-09 PROCEDURE — 99001 SPECIMEN HANDLING PT-LAB: CPT

## 2020-03-09 NOTE — PATIENT INSTRUCTIONS
A Healthy Lifestyle: Care Instructions  Your Care Instructions    A healthy lifestyle can help you feel good, stay at a healthy weight, and have plenty of energy for both work and play. A healthy lifestyle is something you can share with your whole family. A healthy lifestyle also can lower your risk for serious health problems, such as high blood pressure, heart disease, and diabetes. You can follow a few steps listed below to improve your health and the health of your family. Follow-up care is a key part of your treatment and safety. Be sure to make and go to all appointments, and call your doctor if you are having problems. It's also a good idea to know your test results and keep a list of the medicines you take. How can you care for yourself at home? · Do not eat too much sugar, fat, or fast foods. You can still have dessert and treats now and then. The goal is moderation. · Start small to improve your eating habits. Pay attention to portion sizes, drink less juice and soda pop, and eat more fruits and vegetables. ? Eat a healthy amount of food. A 3-ounce serving of meat, for example, is about the size of a deck of cards. Fill the rest of your plate with vegetables and whole grains. ? Limit the amount of soda and sports drinks you have every day. Drink more water when you are thirsty. ? Eat at least 5 servings of fruits and vegetables every day. It may seem like a lot, but it is not hard to reach this goal. A serving or helping is 1 piece of fruit, 1 cup of vegetables, or 2 cups of leafy, raw vegetables. Have an apple or some carrot sticks as an afternoon snack instead of a candy bar. Try to have fruits and/or vegetables at every meal.  · Make exercise part of your daily routine. You may want to start with simple activities, such as walking, bicycling, or slow swimming. Try to be active 30 to 60 minutes every day. You do not need to do all 30 to 60 minutes all at once.  For example, you can exercise 3 times a day for 10 or 20 minutes. Moderate exercise is safe for most people, but it is always a good idea to talk to your doctor before starting an exercise program.  · Keep moving. [employer] the lawn, work in the garden, or [employer] Take the stairs instead of the elevator at work. · If you smoke, quit. People who smoke have an increased risk for heart attack, stroke, cancer, and other lung illnesses. Quitting is hard, but there are ways to boost your chance of quitting tobacco for good. ? Use nicotine gum, patches, or lozenges. ? Ask your doctor about stop-smoking programs and medicines. ? Keep trying. In addition to reducing your risk of diseases in the future, you will notice some benefits soon after you stop using tobacco. If you have shortness of breath or asthma symptoms, they will likely get better within a few weeks after you quit. · Limit how much alcohol you drink. Moderate amounts of alcohol (up to 2 drinks a day for men, 1 drink a day for women) are okay. But drinking too much can lead to liver problems, high blood pressure, and other health problems. Family health  If you have a family, there are many things you can do together to improve your health. · Eat meals together as a family as often as possible. · Eat healthy foods. This includes fruits, vegetables, lean meats and dairy, and whole grains. · Include your family in your fitness plan. Most people think of activities such as jogging or tennis as the way to fitness, but there are many ways you and your family can be more active. Anything that makes you breathe hard and gets your heart pumping is exercise. Here are some tips:  ? Walk to do errands or to take your child to school or the bus.  ? Go for a family bike ride after dinner instead of watching TV. Where can you learn more? Go to http://tanner-paul.info/. Enter B258 in the search box to learn more about \"A Healthy Lifestyle: Care Instructions. \"  Current as of: May 28, 2019  Content Version: 12.2  © 7595-9540 [employer]. Care instructions adapted under license by [employer] (which disclaims liability or warranty for this information). If you have questions about a medical condition or this instruction, always ask your healthcare professional. [address] any warranty or liability for your use of this information.

## 2020-03-09 NOTE — PROGRESS NOTES
SUBJECTIVE  Chief Complaint   Patient presents with    Joint Pain     arms and legs x 1 month; no OTC pain relievers     Patient presents for a 1 month history of bilateral shoulder and bilateral groin pain. She has had daily and constant symptoms. She has not tried anything. She says she has not started any new activities. She has been off of Depo for a few months. She does not think she is pregnant. She says she does not get periods. She is seeing OB. GYN and soon to get back on Depo she says. She has had some mild increased stress earlier in the year from a rash on her nose that was diagnosed as herpes. No personal history of autoimmune disease. Her brother does have Crohns. She denies any weakness or paresthesias. OBJECTIVE    Blood pressure 96/62, pulse 85, temperature 98.7 °F (37.1 °C), temperature source Oral, resp. rate 14, height 5' 6\" (1.676 m), weight 153 lb (69.4 kg), SpO2 98 %, currently breastfeeding. General:  Alert, cooperative, well appearing, in no apparent distress. Shoulders:  Full ROM with bilateral pain with full lateral abduction. She has pain with resisted supraspinatus testing but no weakness. She has bilateral impingement. No pain or weakness on resisted internal or external ROM testing. Hips:  She has pain with internal and external ROM testing. She has pain with flexion of her hip muscles. She has no IT band or trochanteric bursa tenderness. Skin:  No rashes, no jaundice. Psych: normal affect. Mood good. Oriented x 3. Judgement and insight intact. ASSESSMENT / PLAN    ICD-10-CM ICD-9-CM    1. Arthralgia, unspecified joint M25.50 719.40 TSH 3RD GENERATION      CBC WITH AUTOMATED DIFF      METABOLIC PANEL, COMPREHENSIVE      SED RATE (ESR)      ESAU COMPREHENSIVE PANEL      RHEUMASSURE   2. Chronic left shoulder pain M25.512 719.41 XR SHOULDER LT AP/LAT MIN 2 V    G89.29 338.29    3.  Chronic right shoulder pain M25.511 719.41 XR SHOULDER RT AP/LAT MIN 2 V G89.29 338.29    4. Pain of both hip joints M25.551 719.45 XR HIPS BI W OR WO AP PELV    M25.552     5. Vitamin D deficiency E55.9 268.9 VITAMIN D, 25 HYDROXY     Lab work-up recommended. She is also advised on films. I advise that she sees her PCP in 1 week to discuss testing and plan of care. I am unsure at this point what to make of her symptoms. I am a bit concerned about an autoimmune disorder like PMR. She has vit D deficiency and takes vit D OTC. All chart history elements were reviewed by me at the time of the visit even though marked at time of note closure. Patient understands our medical plan. Patient has provided input and agrees with goals. Alternatives have been explained and offered. All questions answered. The patient is to call if condition worsens or fails to improve. Follow-up and Dispositions    · Return in about 1 week (around 3/16/2020) for joint pain, x-ray and lab review with Dr. Benja Hennessy .

## 2020-03-09 NOTE — PROGRESS NOTES
1. Have you been to the ER, urgent care clinic since your last visit? Hospitalized since your last visit? No    2. Have you seen or consulted any other health care providers outside of the [address] [place] [address] since your last visit? Include any pap smears or colon screening.  No

## 2020-03-13 ENCOUNTER — HOSPITAL ENCOUNTER (EMERGENCY)
Age: 30
Discharge: HOME | End: 2020-03-13
Attending: EMERGENCY MEDICINE
Payer: MEDICAID

## 2020-03-13 VITALS
OXYGEN SATURATION: 100 % | BODY MASS INDEX: 23.49 KG/M2 | HEIGHT: 68 IN | HEART RATE: 84 BPM | SYSTOLIC BLOOD PRESSURE: 103 MMHG | WEIGHT: 155 LBS | TEMPERATURE: 98.7 F | RESPIRATION RATE: 18 BRPM | DIASTOLIC BLOOD PRESSURE: 66 MMHG

## 2020-03-13 DIAGNOSIS — J02.9 SORE THROAT: Primary | ICD-10-CM

## 2020-03-13 PROCEDURE — 87081 CULTURE SCREEN ONLY: CPT

## 2020-03-13 PROCEDURE — 87077 CULTURE AEROBIC IDENTIFY: CPT

## 2020-03-13 PROCEDURE — 99282 EMERGENCY DEPT VISIT SF MDM: CPT

## 2020-03-13 NOTE — ED PROVIDER NOTES
EMERGENCY DEPARTMENT HISTORY AND PHYSICAL EXAM    3:39 PM      Date: 3/13/2020  Patient Name: Magy Messer    History of Presenting Illness     Chief Complaint   Patient presents with    Sore Throat       History Provided By: Patient  Chief complaint: sore throat X 2 days. Concerned about possible strep throat. 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 No fevers, chills, congestion, cough, nausea, vomiting, diarrhea, or abd pain. Associated Symptoms: none    Additional History (Context): Magy Messer is a 26 y.o. female with no contributing past medical history who presented to the ED as noted above. PCP: Cat Luna MD    Current Outpatient Medications   Medication Sig Dispense Refill    CALCIUM PO Take 1 Tab by mouth daily.  valACYclovir (VALTREX) 1 gram tablet       triamcinolone acetonide (KENALOG) 0.1 % topical cream Apply  to affected area two (2) times daily as needed for Skin Irritation. 15 g 1    cholecalciferol, vitamin D3, (VITAMIN D3 PO) Take  by mouth.  montelukast (SINGULAIR) 10 mg tablet Take 1 Tab by mouth daily. 30 Tab 1    folic acid 629 mcg tablet Take 800 mcg by mouth daily.  vitamin e (E GEMS) 100 unit capsule Take  by mouth daily.  cyanocobalamin (VITAMIN B12) 100 mcg tablet Take 100 mcg by mouth daily.  Biotin 2,500 mcg cap Take  by mouth.  medroxyPROGESTERone (DEPO-PROVERA) 150 mg/mL injection 150 mg by IntraMUSCular route once.  albuterol (PROVENTIL HFA, VENTOLIN HFA, PROAIR HFA) 90 mcg/actuation inhaler Take 2 Puffs by inhalation every four (4) hours as needed for Wheezing. 1 Inhaler 0    multivitamin (ONE A DAY) tablet Take 1 Tab by mouth daily.          Past History     Past Medical History:  Past Medical History:   Diagnosis Date    Bell's palsy     Constipation     Dysphagia     Umbilical hernia        Past Surgical History:  Past Surgical History:   Procedure Laterality Date    HX GYN      IUD removed Family History:  Family History   Problem Relation Age of Onset    Crohn's Disease Brother        Social History:  Social History     Tobacco Use    Smoking status: Never Smoker    Smokeless tobacco: Never Used   Substance Use Topics    Alcohol use: Yes     Frequency: Monthly or less     Drinks per session: 1 or 2     Binge frequency: Never     Comment: socially    Drug use: No       Allergies:  No Known Allergies      Review of Systems       Review of Systems   Constitutional: Negative for chills, diaphoresis, fatigue and fever. HENT: Positive for sore throat. Negative for congestion and ear pain. Eyes: Negative for pain. Respiratory: Negative for cough, chest tightness, shortness of breath and wheezing. Cardiovascular: Negative for chest pain, palpitations and leg swelling. Gastrointestinal: Negative for abdominal pain, constipation, diarrhea, nausea and vomiting. Genitourinary: Negative for dysuria, flank pain, hematuria, pelvic pain, vaginal bleeding and vaginal discharge. Musculoskeletal: Negative for back pain, joint swelling, neck pain and neck stiffness. Neurological: Negative for dizziness, weakness, light-headedness and headaches. Physical Exam     Visit Vitals  /66 (BP 1 Location: Left arm, BP Patient Position: Sitting)   Pulse 84   Temp 98.7 °F (37.1 °C)   Resp 18   Ht 5' 8\" (1.727 m)   Wt 70.3 kg (155 lb)   SpO2 100%   BMI 23.57 kg/m²         Physical Exam  Vitals signs and nursing note reviewed. Constitutional:       General: She is not in acute distress. Appearance: Normal appearance. She is not ill-appearing, toxic-appearing or diaphoretic. HENT:      Head: Normocephalic and atraumatic. Right Ear: Tympanic membrane normal.      Left Ear: Tympanic membrane normal.      Nose: Rhinorrhea present. No congestion. Mouth/Throat:      Mouth: Mucous membranes are moist. No oral lesions. Pharynx: Uvula midline.  Posterior oropharyngeal erythema present. No pharyngeal swelling, oropharyngeal exudate or uvula swelling. Tonsils: No tonsillar exudate or tonsillar abscesses. Eyes:      Conjunctiva/sclera: Conjunctivae normal.   Neck:      Musculoskeletal: Normal range of motion and neck supple. No neck rigidity or muscular tenderness. Cardiovascular:      Rate and Rhythm: Normal rate and regular rhythm. Pulses: Normal pulses. Heart sounds: Normal heart sounds. No murmur. Pulmonary:      Effort: Pulmonary effort is normal. No respiratory distress. Breath sounds: Normal breath sounds. No wheezing. Abdominal:      General: Bowel sounds are normal. There is no distension. Palpations: Abdomen is soft. Tenderness: There is no abdominal tenderness. There is no right CVA tenderness or left CVA tenderness. Musculoskeletal: Normal range of motion. Skin:     General: Skin is warm and dry. Capillary Refill: Capillary refill takes less than 2 seconds. Neurological:      General: No focal deficit present. Mental Status: She is alert and oriented to person, place, and time. Psychiatric:         Behavior: Behavior normal.       Diagnostic Study Results     Labs -  Recent Results (from the past 12 hour(s))   STREP THROAT SCREEN    Collection Time: 03/13/20  2:31 PM   Result Value Ref Range    Special Requests: NO SPECIAL REQUESTS      Strep Screen NEGATIVE       Culture result: PENDING        Radiologic Studies -   No orders to display         Medical Decision Making   I am the first provider for this patient. I reviewed the vital signs, available nursing notes, past medical history, past surgical history, family history and social history. Vital Signs-Reviewed the patient's vital signs. Records Reviewed: Nursing Notes and Old Medical Records (Time of Review: 3:39 PM)    ED Course: Progress Notes, Reevaluation, and Consults:      Provider Notes (Medical Decision Making):  This is a 27-year-old female with no contributing past medical history who presented to the ED with chief complaint of sore throat X 2 days. She is concerned about possible strep throat. 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 She denies any fevers, chills, congestion, cough, nausea, vomiting, diarrhea, or abd pain. Her vital signs are stable. On exam noted with rhinorrhea, swollen bilateral turbinates without occlusion. Oropharynx with mild erythema, no exudate. No oral lesions. No tonsillar exudate or swelling. Uvula at midline. Lung sounds clear to auscultation bilaterally. Abdomen benign. No CVA tenderness appreciated. Rapid strep preliminary negative. Culture remains pending. Will discharge to home at this time with conservative management. Extensive review of return precautions discussed with patient prior to discharge. Discussed importance of PCP follow-up/reassessment and need to return to ED immediately if any of symptoms worsen. Patient verbalizes understanding of these instructions and is in agreement with discharge plan. We did call in 2 to 3 days of final strep throat culture results. Ms. Santa Coyne feels comfortable leaving the ED at this time. Diagnosis     Clinical Impression:   1. Sore throat        Disposition: home     3:39 PM  Reviewed results with patient. Discussed need for close outpatient follow-up this week for reassessment. Follow-up Information     Follow up With Specialties Details [facility] Medicine  If symptoms worsen [address]    Zach Collado MD Family Practice In 2 days  [address] [facility]  [address] [facility]  [phone]             Discharge Medication List as of 3/13/2020  2:56 PM      CONTINUE these medications which have NOT CHANGED    Details   CALCIUM PO Take 1 Tab by mouth daily. , Historical Med      valACYclovir (VALTREX) 1 gram tablet Historical Med triamcinolone acetonide (KENALOG) 0.1 % topical cream Apply  to affected area two (2) times daily as needed for Skin Irritation. , Normal, Disp-15 g, R-1      cholecalciferol, vitamin D3, (VITAMIN D3 PO) Take  by mouth., Historical Med      montelukast (SINGULAIR) 10 mg tablet Take 1 Tab by mouth daily. , Normal, Disp-30 Tab, R-1      folic acid 813 mcg tablet Take 800 mcg by mouth daily. , Historical Med      vitamin e (E GEMS) 100 unit capsule Take  by mouth daily. , Historical Med      cyanocobalamin (VITAMIN B12) 100 mcg tablet Take 100 mcg by mouth daily. , Historical Med      Biotin 2,500 mcg cap Take  by mouth., Historical Med      medroxyPROGESTERone (DEPO-PROVERA) 150 mg/mL injection 150 mg by IntraMUSCular route once., Historical Med      albuterol (PROVENTIL HFA, VENTOLIN HFA, PROAIR HFA) 90 mcg/actuation inhaler Take 2 Puffs by inhalation every four (4) hours as needed for Wheezing., Normal, Disp-1 Inhaler, R-0      multivitamin (ONE A DAY) tablet Take 1 Tab by mouth daily. , Historical Med             Dictation disclaimer:  Please note that this dictation was completed with [employer], the [employer] voice recognition software. Quite often unanticipated grammatical, syntax, homophones, and other interpretive errors are inadvertently transcribed by the computer software. Please disregard these errors. Please excuse any errors that have escaped final proofreading.

## 2020-03-13 NOTE — LETTER
NOTIFICATION RETURN TO WORK / SCHOOL 
 
3/13/2020 2:56 PM 
 
Ms. Marlon Rizvi [address] [address] To Whom It May Concern: 
 
Marlon Rizvi is currently under the care of [id number] [facility] EMERGENCY DEPT. She will return to work on Monday March 16, 2020 If there are questions or concerns please have the patient contact our office. Sincerely, Riya Espinoza NP

## 2020-03-13 NOTE — DISCHARGE INSTRUCTIONS

## 2020-03-14 LAB
B-HEM STREP THROAT QL CULT: NEGATIVE
BACTERIA SPEC CULT: ABNORMAL
BACTERIA SPEC CULT: ABNORMAL
SERVICE CMNT-IMP: ABNORMAL

## 2020-03-15 LAB
14.3.3 ETA, RHEUM. ARTHRITIS: <0.2 NG/ML
ALBUMIN SERPL-MCNC: 4.5 G/DL (ref 3.8–4.8)
ALBUMIN/GLOB SERPL: 1.7 {RATIO} (ref 1.2–2.2)
ALP SERPL-CCNC: 48 IU/L (ref 39–117)
ALT SERPL-CCNC: 12 IU/L (ref 0–32)
AST SERPL-CCNC: 18 IU/L (ref 0–40)
BASOPHILS # BLD AUTO: 0 X10E3/UL (ref 0–0.2)
BASOPHILS NFR BLD AUTO: 1 %
BILIRUB SERPL-MCNC: 0.4 MG/DL (ref 0–1.2)
BUN SERPL-MCNC: 10 MG/DL (ref 6–20)
BUN/CREAT SERPL: 13 (ref 9–23)
CALCIUM SERPL-MCNC: 9.5 MG/DL (ref 8.7–10.2)
CCP IGA+IGG SERPL IA-ACNC: <20 UNITS
CENTROMERE B AB SER-ACNC: <0.2 AI (ref 0–0.9)
CHLORIDE SERPL-SCNC: 102 MMOL/L (ref 96–106)
CHROMATIN AB SERPL-ACNC: <0.2 AI (ref 0–0.9)
CO2 SERPL-SCNC: 22 MMOL/L (ref 20–29)
CREAT SERPL-MCNC: 0.78 MG/DL (ref 0.57–1)
DSDNA AB SER-ACNC: <1 IU/ML (ref 0–9)
ENA JO1 AB SER-ACNC: <0.2 AI (ref 0–0.9)
ENA RNP AB SER-ACNC: 0.5 AI (ref 0–0.9)
ENA SCL70 AB SER-ACNC: <0.2 AI (ref 0–0.9)
ENA SM AB SER-ACNC: <0.2 AI (ref 0–0.9)
ENA SS-A AB SER-ACNC: <0.2 AI (ref 0–0.9)
ENA SS-B AB SER-ACNC: <0.2 AI (ref 0–0.9)
EOSINOPHIL # BLD AUTO: 0.1 X10E3/UL (ref 0–0.4)
EOSINOPHIL NFR BLD AUTO: 1 %
ERYTHROCYTE [DISTWIDTH] IN BLOOD BY AUTOMATED COUNT: 11.9 % (ref 11.7–15.4)
ERYTHROCYTE [SEDIMENTATION RATE] IN BLOOD BY WESTERGREN METHOD: 26 MM/HR (ref 0–32)
GLOBULIN SER CALC-MCNC: 2.7 G/DL (ref 1.5–4.5)
GLUCOSE SERPL-MCNC: 88 MG/DL (ref 65–99)
HCT VFR BLD AUTO: 37.8 % (ref 34–46.6)
HGB BLD-MCNC: 13.2 G/DL (ref 11.1–15.9)
IMM GRANULOCYTES # BLD AUTO: 0 X10E3/UL (ref 0–0.1)
IMM GRANULOCYTES NFR BLD AUTO: 0 %
LYMPHOCYTES # BLD AUTO: 1.8 X10E3/UL (ref 0.7–3.1)
LYMPHOCYTES NFR BLD AUTO: 32 %
MCH RBC QN AUTO: 31.2 PG (ref 26.6–33)
MCHC RBC AUTO-ENTMCNC: 34.9 G/DL (ref 31.5–35.7)
MCV RBC AUTO: 89 FL (ref 79–97)
MONOCYTES # BLD AUTO: 0.5 X10E3/UL (ref 0.1–0.9)
MONOCYTES NFR BLD AUTO: 8 %
NEUTROPHILS # BLD AUTO: 3.2 X10E3/UL (ref 1.4–7)
NEUTROPHILS NFR BLD AUTO: 58 %
PLATELET # BLD AUTO: 268 X10E3/UL (ref 150–450)
POTASSIUM SERPL-SCNC: 4.5 MMOL/L (ref 3.5–5.2)
PROT SERPL-MCNC: 7.2 G/DL (ref 6–8.5)
RBC # BLD AUTO: 4.23 X10E6/UL (ref 3.77–5.28)
RHEUMATOID FACT SERPL-ACNC: <14 UNITS/ML
SEE BELOW, 164869: NORMAL
SODIUM SERPL-SCNC: 140 MMOL/L (ref 134–144)
TSH SERPL DL<=0.005 MIU/L-ACNC: 2.05 UIU/ML (ref 0.45–4.5)
WBC # BLD AUTO: 5.6 X10E3/UL (ref 3.4–10.8)

## 2020-03-17 ENCOUNTER — HOSPITAL ENCOUNTER (EMERGENCY)
Age: 30
Discharge: HOME | End: 2020-03-17
Attending: EMERGENCY MEDICINE
Payer: MEDICAID

## 2020-03-17 VITALS
TEMPERATURE: 101.7 F | SYSTOLIC BLOOD PRESSURE: 113 MMHG | HEART RATE: 99 BPM | HEIGHT: 68 IN | WEIGHT: 155 LBS | BODY MASS INDEX: 23.49 KG/M2 | OXYGEN SATURATION: 100 % | RESPIRATION RATE: 17 BRPM | DIASTOLIC BLOOD PRESSURE: 67 MMHG

## 2020-03-17 DIAGNOSIS — J06.9 ACUTE URI: Primary | ICD-10-CM

## 2020-03-17 PROCEDURE — 99282 EMERGENCY DEPT VISIT SF MDM: CPT

## 2020-03-17 RX ORDER — IBUPROFEN 800 MG/1
800 TABLET ORAL
Qty: 20 TAB | Refills: 0 | Status: SHIPPED | OUTPATIENT
Start: 2020-03-17 | End: 2020-03-24

## 2020-03-17 RX ORDER — BENZONATATE 100 MG/1
100 CAPSULE ORAL
Qty: 30 CAP | Refills: 0 | Status: SHIPPED | OUTPATIENT
Start: 2020-03-17 | End: 2020-03-24

## 2020-03-17 NOTE — PROGRESS NOTES
Let patient know that recent labs ordered by Dr. Nancy Pereyra has been fairly okay. Keep the follow-up appointment to discuss it further.

## 2020-03-18 NOTE — DISCHARGE INSTRUCTIONS
Patient Education        Upper Respiratory Infection (Cold): Care Instructions  Your Care Instructions    An upper respiratory infection, or URI, is an infection of the nose, sinuses, or throat. URIs are spread by coughs, sneezes, and direct contact. The common cold is the most frequent kind of URI. The flu and sinus infections are other kinds of URIs. Almost all URIs are caused by viruses. Antibiotics won't cure them. But you can treat most infections with home care. This may include drinking lots of fluids and taking over-the-counter pain medicine. You will probably feel better in 4 to 10 days. The doctor has checked you carefully, but problems can develop later. If you notice any problems or new symptoms, get medical treatment right away. Follow-up care is a key part of your treatment and safety. Be sure to make and go to all appointments, and call your doctor if you are having problems. It's also a good idea to know your test results and keep a list of the medicines you take. How can you care for yourself at home? · To prevent dehydration, drink plenty of fluids, enough so that your urine is light yellow or clear like water. Choose water and other caffeine-free clear liquids until you feel better. If you have kidney, heart, or liver disease and have to limit fluids, talk with your doctor before you increase the amount of fluids you drink. · Take an over-the-counter pain medicine, such as acetaminophen (Tylenol), ibuprofen (Advil, Motrin), or naproxen (Aleve). Read and follow all instructions on the label. · Before you use cough and cold medicines, check the label. These medicines may not be safe for young children or for people with certain health problems. · Be careful when taking over-the-counter cold or flu medicines and Tylenol at the same time. Many of these medicines have acetaminophen, which is Tylenol. Read the labels to make sure that you are not taking more than the recommended dose.  Too much acetaminophen (Tylenol) can be harmful. · Get plenty of rest.  · Do not smoke or allow others to smoke around you. If you need help quitting, talk to your doctor about stop-smoking programs and medicines. These can increase your chances of quitting for good. When should you call for help? Call 911 anytime you think you may need emergency care. For example, call if:    · You have severe trouble breathing.    Call your doctor now or seek immediate medical care if:    · You seem to be getting much sicker.     · You have new or worse trouble breathing.     · You have a new or higher fever.     · You have a new rash.    Watch closely for changes in your health, and be sure to contact your doctor if:    · You have a new symptom, such as a sore throat, an earache, or sinus pain.     · You cough more deeply or more often, especially if you notice more mucus or a change in the color of your mucus.     · You do not get better as expected. Where can you learn more? Go to http://www.gray.com/  Enter K520 in the search box to learn more about \"Upper Respiratory Infection (Cold): Care Instructions. \"  Current as of: June 9, 2019Content Version: 12.4  © 2984-2465 Healthwise, Incorporated. Care instructions adapted under license by [employer] (which disclaims liability or warranty for this information). If you have questions about a medical condition or this instruction, always ask your healthcare professional. [place] [address] any warranty or liability for your use of this information. Patient Education        Saline Nasal Washes: Care Instructions  Your Care Instructions  Saline nasal washes help keep the nasal passages open by washing out thick or dried mucus. This simple remedy can help relieve symptoms of allergies, sinusitis, and colds.  It also can make the nose feel more comfortable by keeping the mucous membranes moist. You may notice a little burning sensation in your nose the first few times you use the solution, but this usually gets better in a few days. Follow-up care is a key part of your treatment and safety. Be sure to make and go to all appointments, and call your doctor if you are having problems. It's also a good idea to know your test results and keep a list of the medicines you take. How can you care for yourself at home? · You can buy premixed saline solution in a squeeze bottle or other sinus rinse products at a drugstore. Read and follow the instructions on the label. · You also can make your own saline solution by adding 1 teaspoon of salt and 1 teaspoon of baking soda to 2 cups of distilled water. · If you use a homemade solution, pour a small amount into a clean bowl. Using a rubber bulb syringe, squeeze the syringe and place the tip in the salt water. Pull a small amount of the salt water into the syringe by relaxing your hand. · Sit down with your head tilted slightly back. Do not lie down. Put the tip of the bulb syringe or the squeeze bottle a little way into one of your nostrils. Gently drip or squirt a few drops into the nostril. Repeat with the other nostril. Some sneezing and gagging are normal at first.  · Gently blow your nose. · Wipe the syringe or bottle tip clean after each use. · Repeat this 2 or 3 times a day. · Use nasal washes gently if you have nosebleeds often. When should you call for help? Watch closely for changes in your health, and be sure to contact your doctor if:    · You often get nosebleeds.     · You have problems doing the nasal washes. Where can you learn more? Go to http://www.gray.com/  Enter B784 in the search box to learn more about \"Saline Nasal Washes: Care Instructions. \"  Current as of: July 28, 2019Content Version: 12.4  © 5942-3073 Healthwise, Incorporated.   Care instructions adapted under license by [employer] (which disclaims liability or warranty for this information). If you have questions about a medical condition or this instruction, always ask your healthcare professional. [place] [address] any warranty or liability for your use of this information.

## 2020-03-18 NOTE — ED PROVIDER NOTES
EMERGENCY DEPARTMENT HISTORY AND PHYSICAL EXAM    Date: 3/17/2020  Patient Name: Karla Mcintyre    History of Presenting Illness     Chief Complaint   Patient presents with    Cough         History Provided By: patient        Additional History (Context): Karla Mcintyre is a 26-year-old female presenting to the emergency department with complaint of flulike symptoms. States she has had symptoms for 1 to 2 weeks. States symptoms have come and gone. States she has had subjective fever, runny nose, headache, sinus pressure and slight cough. Also reports of sore throat. No recent travel, no recent pos for corona virus contacts. PCP: Naty Restrepo MD    Current Outpatient Medications   Medication Sig Dispense Refill    benzonatate (Tessalon Perles) 100 mg capsule Take 1 Cap by mouth three (3) times daily as needed for Cough for up to 7 days. 30 Cap 0    ibuprofen (MOTRIN) 800 mg tablet Take 1 Tab by mouth every six (6) hours as needed for Pain for up to 7 days. 20 Tab 0    CALCIUM PO Take 1 Tab by mouth daily.  valACYclovir (VALTREX) 1 gram tablet       triamcinolone acetonide (KENALOG) 0.1 % topical cream Apply  to affected area two (2) times daily as needed for Skin Irritation. 15 g 1    cholecalciferol, vitamin D3, (VITAMIN D3 PO) Take  by mouth.  montelukast (SINGULAIR) 10 mg tablet Take 1 Tab by mouth daily. 30 Tab 1    folic acid 256 mcg tablet Take 800 mcg by mouth daily.  vitamin e (E GEMS) 100 unit capsule Take  by mouth daily.  cyanocobalamin (VITAMIN B12) 100 mcg tablet Take 100 mcg by mouth daily.  Biotin 2,500 mcg cap Take  by mouth.  medroxyPROGESTERone (DEPO-PROVERA) 150 mg/mL injection 150 mg by IntraMUSCular route once.  albuterol (PROVENTIL HFA, VENTOLIN HFA, PROAIR HFA) 90 mcg/actuation inhaler Take 2 Puffs by inhalation every four (4) hours as needed for Wheezing. 1 Inhaler 0    multivitamin (ONE A DAY) tablet Take 1 Tab by mouth daily. Past History     Past Medical History:  Past Medical History:   Diagnosis Date    Bell's palsy     Constipation     Dysphagia     Umbilical hernia        Past Surgical History:  Past Surgical History:   Procedure Laterality Date    HX GYN      IUD removed       Family History:  Family History   Problem Relation Age of Onset    Crohn's Disease Brother        Social History:  Social History     Tobacco Use    Smoking status: Never Smoker    Smokeless tobacco: Never Used   Substance Use Topics    Alcohol use: Yes     Frequency: Monthly or less     Drinks per session: 1 or 2     Binge frequency: Never     Comment: socially    Drug use: No       Allergies:  No Known Allergies      Review of Systems       Review of Systems   Constitutional: Negative for chills and fever. HENT: Negative for nasal congestion, sore throat, rhinorrhea  Eyes: Negative. Respiratory: pos  for cough and negative for shortness of breath. Cardiovascular: Negative for chest pain and palpitations. Gastrointestinal: Negative for abdominal pain, constipation, diarrhea, nausea and vomiting. Genitourinary: Negative. Negative for difficulty urinating and flank pain. Musculoskeletal: Negative for back pain. Negative for gait problem and neck pain. Skin: Negative. Allergic/Immunologic: Negative. Neurological: Negative for dizziness, weakness, numbness and headaches. Psychiatric/Behavioral: Negative. All other systems reviewed and are negative. All Other Systems Negative  Physical Exam     Vitals:    03/17/20 2120   BP: 113/67   Pulse: 99   Resp: 17   Temp: (!) 101.7 °F (38.7 °C)   SpO2: 100%   Weight: 70.3 kg (155 lb)   Height: 5' 8\" (1.727 m)     Physical Exam  Vitals signs and nursing note reviewed. Constitutional:       General: She is not in acute distress. Appearance: She is well-developed. She is not diaphoretic.       Comments: NAD, well hydrated, non toxic     HENT:      Head: Normocephalic and atraumatic. Nose: Nose normal.      Mouth/Throat:      Pharynx: No oropharyngeal exudate. Eyes:      Conjunctiva/sclera: Conjunctivae normal.      Pupils: Pupils are equal, round, and reactive to light. Neck:      Musculoskeletal: Normal range of motion and neck supple. Cardiovascular:      Rate and Rhythm: Normal rate and regular rhythm. Heart sounds: Normal heart sounds. No murmur. Pulmonary:      Effort: Pulmonary effort is normal. No respiratory distress. Breath sounds: Normal breath sounds. No wheezing or rales. Abdominal:      General: There is no distension. Palpations: Abdomen is soft. Tenderness: There is no abdominal tenderness. There is no guarding. Musculoskeletal: Normal range of motion. Lymphadenopathy:      Cervical: No cervical adenopathy. Skin:     General: Skin is warm. Findings: No rash. Neurological:      Mental Status: She is alert and oriented to person, place, and time. Cranial Nerves: No cranial nerve deficit. Coordination: Coordination normal.   Psychiatric:         Behavior: Behavior normal.           Diagnostic Study Results     Labs -   No results found for this or any previous visit (from the past 12 hour(s)). Radiologic Studies -   No orders to display     CT Results  (Last 48 hours)    None        CXR Results  (Last 48 hours)    None            Medical Decision Making   I am the first provider for this patient. I reviewed the vital signs, available nursing notes, past medical history, past surgical history, family history and social history. Vital Signs-Reviewed the patient's vital signs.         Records Reviewed: Nursing notes, old medical records and any previous labs, imaging, visits, consultations pertinent to patient care    Procedures:  Procedures    ED Course: Progress Notes, Reevaluation, and Consults:      Provider Notes (Medical Decision Making):   Pt is a well appearing pt here with cough without associated SOB, CP, fever, chills. Temp elevated, exam WNL. Lungs CTA. Pt refusing need for flu swab. Has normal O2 saturation, and no known exposure to coronavirus. Per CDC guidelines patient does not meet current criteria for testing. Furthermore, symptoms are not suggestive of influenza or pneumonia. Coronavirus precautions reviewed. Home isolation precautions provided and discharge instructions. Work up here otherwise unremarkable and will be d/c home with supportive medication and pcp follow up. MED RECONCILIATION:  No current facility-administered medications for this encounter. Current Outpatient Medications   Medication Sig    benzonatate (Tessalon Perles) 100 mg capsule Take 1 Cap by mouth three (3) times daily as needed for Cough for up to 7 days.  ibuprofen (MOTRIN) 800 mg tablet Take 1 Tab by mouth every six (6) hours as needed for Pain for up to 7 days.  CALCIUM PO Take 1 Tab by mouth daily.  valACYclovir (VALTREX) 1 gram tablet     triamcinolone acetonide (KENALOG) 0.1 % topical cream Apply  to affected area two (2) times daily as needed for Skin Irritation.  cholecalciferol, vitamin D3, (VITAMIN D3 PO) Take  by mouth.  montelukast (SINGULAIR) 10 mg tablet Take 1 Tab by mouth daily.  folic acid 345 mcg tablet Take 800 mcg by mouth daily.  vitamin e (E GEMS) 100 unit capsule Take  by mouth daily.  cyanocobalamin (VITAMIN B12) 100 mcg tablet Take 100 mcg by mouth daily.  Biotin 2,500 mcg cap Take  by mouth.  medroxyPROGESTERone (DEPO-PROVERA) 150 mg/mL injection 150 mg by IntraMUSCular route once.  albuterol (PROVENTIL HFA, VENTOLIN HFA, PROAIR HFA) 90 mcg/actuation inhaler Take 2 Puffs by inhalation every four (4) hours as needed for Wheezing.  multivitamin (ONE A DAY) tablet Take 1 Tab by mouth daily. Disposition:  home    DISCHARGE NOTE:     Pt has been reexamined. Patient has no new complaints, changes, or physical findings.   Care plan outlined and precautions discussed. Discussed proper way to take medications. Discussed treatment plan, return precautions, symptomatic relief, and expected time to improvement. All questions answered. Patient is stable for discharge and outpatient management. Patient is ready to go home. Follow-up Information    None         Discharge Medication List as of 3/17/2020  9:37 PM      START taking these medications    Details   benzonatate (Tessalon Perles) 100 mg capsule Take 1 Cap by mouth three (3) times daily as needed for Cough for up to 7 days. , Normal, Disp-30 Cap, R-0      ibuprofen (MOTRIN) 800 mg tablet Take 1 Tab by mouth every six (6) hours as needed for Pain for up to 7 days. , Normal, Disp-20 Tab, R-0         CONTINUE these medications which have NOT CHANGED    Details   CALCIUM PO Take 1 Tab by mouth daily. , Historical Med      valACYclovir (VALTREX) 1 gram tablet Historical Med      triamcinolone acetonide (KENALOG) 0.1 % topical cream Apply  to affected area two (2) times daily as needed for Skin Irritation. , Normal, Disp-15 g, R-1      cholecalciferol, vitamin D3, (VITAMIN D3 PO) Take  by mouth., Historical Med      montelukast (SINGULAIR) 10 mg tablet Take 1 Tab by mouth daily. , Normal, Disp-30 Tab, R-1      folic acid 267 mcg tablet Take 800 mcg by mouth daily. , Historical Med      vitamin e (E GEMS) 100 unit capsule Take  by mouth daily. , Historical Med      cyanocobalamin (VITAMIN B12) 100 mcg tablet Take 100 mcg by mouth daily. , Historical Med      Biotin 2,500 mcg cap Take  by mouth., Historical Med      medroxyPROGESTERone (DEPO-PROVERA) 150 mg/mL injection 150 mg by IntraMUSCular route once., Historical Med      albuterol (PROVENTIL HFA, VENTOLIN HFA, PROAIR HFA) 90 mcg/actuation inhaler Take 2 Puffs by inhalation every four (4) hours as needed for Wheezing., Normal, Disp-1 Inhaler, R-0      multivitamin (ONE A DAY) tablet Take 1 Tab by mouth daily. , Historical Med                   Diagnosis     Clinical Impression:   1. Acute URI            Dictation disclaimer:  Please note that this dictation was completed with Onzo, the computer voice recognition software. Quite often unanticipated grammatical, syntax, homophones, and other interpretive errors are inadvertently transcribed by the computer software. Please disregard these errors. Please excuse any errors that have escaped final proofreading.

## 2020-03-18 NOTE — ED TRIAGE NOTES
Pt reports cough, sweats, fever (101.7). States seen last Friday for sore throat. Pt in nad, speaking in full sentences without any difficulty. Denies any nausea, vomiting, diarrhea or fever. Reports rhinorrhea.

## 2020-03-19 ENCOUNTER — PATIENT OUTREACH (OUTPATIENT)
Dept: FAMILY MEDICINE CLINIC | Age: 30
End: 2020-03-19

## 2020-03-19 ENCOUNTER — OFFICE VISIT (OUTPATIENT)
Dept: FAMILY MEDICINE CLINIC | Age: 30
End: 2020-03-19

## 2020-03-19 DIAGNOSIS — J11.1 INFLUENZA: ICD-10-CM

## 2020-03-19 DIAGNOSIS — R68.89 FLU-LIKE SYMPTOMS: ICD-10-CM

## 2020-03-19 DIAGNOSIS — J02.9 SORE THROAT: Primary | ICD-10-CM

## 2020-03-19 LAB
FLUAV+FLUBV AG NOSE QL IA.RAPID: NEGATIVE POS/NEG
FLUAV+FLUBV AG NOSE QL IA.RAPID: POSITIVE POS/NEG
VALID INTERNAL CONTROL?: YES

## 2020-03-19 RX ORDER — AMOXICILLIN AND CLAVULANATE POTASSIUM 875; 125 MG/1; MG/1
1 TABLET, FILM COATED ORAL 2 TIMES DAILY
Qty: 20 TAB | Refills: 0 | Status: SHIPPED | OUTPATIENT
Start: 2020-03-19 | End: 2020-03-29

## 2020-03-19 RX ORDER — OSELTAMIVIR PHOSPHATE 75 MG/1
75 CAPSULE ORAL 2 TIMES DAILY
Qty: 10 CAP | Refills: 0 | Status: SHIPPED | OUTPATIENT
Start: 2020-03-19 | End: 2020-03-24

## 2020-03-19 NOTE — PROGRESS NOTES
COVID-19 Screening Initial Follow-up Note    Per Chart Review patient attended PCP follow up appointment on this date, 3-. CTN will contact patient at another time or date for follow up per permission of manager of care coordination of [facility].

## 2020-03-19 NOTE — PROGRESS NOTES
HISTORY OF PRESENT ILLNESS  Elise Lefort is a 26 y.o. female. HPI: Patient here calling from the car mentioning having sore throat, cough, fever around 101. Said taking Tylenol Motrin not helping fever. Generalized body ache. No nausea or vomiting, abdominal pain, urinary or bowel complaints. No shortness of breath, chest pain, diaphoresis or palpitation. Temp taken by patient at home 101.0 °F  Denies any headache or dizziness. Appetite fair. She was seen in the ER on March 17. She did not meet the criteria to test for COVID 19 and she was given the instructions on isolation and send patient home. Today noted in the chart that throat culture noted few streptococcal group F bacteria is positive. She still has a sore throat. She was swabbed for influenza in her car. She is positive for influenza A. Discussed patient that sending antibiotic and Tamiflu. Her family has to be taking Tamiflu preventive doses for influenza. Again hand hygiene and isolation discussed with patient. She did have a facial mask with her. Advised her to go to the emergency room with any worsening of symptoms. Patient understood and agree with above plan. ROS : see HPI   Physical Exam  Constitutional:       Comments: Per nursing not in any acute distress. Not using any extra respiratory muscle, audible wheezing. Able to talk in full sentence. Pulmonary:      Breath sounds: No wheezing. Neurological:      Mental Status: She is oriented to person, place, and time. ASSESSMENT and PLAN    ICD-10-CM ICD-9-CM    1. Sore throat: Noted throat culture was positive with a few streptococcal group F bacteria. Giving her Augmentin. Advised her to take it with the food or probiotic over-the-counter. J02.9 462 amoxicillin-clavulanate (AUGMENTIN) 875-125 mg per tablet   2. Influenza: She was given Tamiflu. Also advised the family member has to be on a preventive dose of Tamiflu.   Hand hygiene and contact precaution reviewed with the patient. J11.1 487.1 oseltamivir (TAMIFLU) 75 mg capsule      AMB POC RAPID INFLUENZA TEST   3. Flu-like symptoms R68.89 780.99 AMB POC RAPID INFLUENZA TEST   Patient understood and agreed with the plan. Advised her to go to the ER with any worsening of symptoms.

## 2020-03-19 NOTE — PROGRESS NOTES
Patient was seen in parking lot today for Flu A. Will review labs with patient on Monday when she calls to follow-up.

## 2020-03-23 ENCOUNTER — TELEPHONE (OUTPATIENT)
Dept: FAMILY MEDICINE CLINIC | Age: 30
End: 2020-03-23

## 2020-03-23 RX ORDER — FLUCONAZOLE 150 MG/1
150 TABLET ORAL DAILY
Qty: 1 TAB | Refills: 0 | Status: SHIPPED | OUTPATIENT
Start: 2020-03-23 | End: 2020-03-24

## 2020-03-23 NOTE — TELEPHONE ENCOUNTER
Patient reports yeast infection from ABX with  influenza. Reports to feeling much better than when she was tested in the parkinglot    Given verbal authorization to sendin diflucan 150 # 1 for yeast infection.

## 2020-04-09 ENCOUNTER — PATIENT OUTREACH (OUTPATIENT)
Dept: CASE MANAGEMENT | Age: 30
End: 2020-04-09

## 2020-04-09 NOTE — PROGRESS NOTES
COVID-19 Screening Initial Follow-up Note    Patient contacted regarding COVID-19  risk. Care Transition Nurse/ Ambulatory Care Manager contacted the patient by telephone to perform post discharge assessment. Verified name and  with patient as identifiers. Provided introduction to self, and explanation of the CTN/ACM role, and reason for call due to risk factors for infection and/or exposure to COVID-19. Symptoms reviewed with patient who verbalized the following symptoms: cough. Patient reports coughing up a little phlegm but states it is clear and thinks it may be related to allergies. Due to no new or worsening symptoms encounter was not routed to provider for escalation. Patient has following risk factors of:   -ED Visit        CTN/ACM reviewed dischar red flags such as increased shortness of breath, increasing fever and signs of decompensation with patient who verbalized understanding. Discussed exposure protocols and quarantine with CDC Guidelines What to do if you are sick with coronavirus disease 2019 Patient who was given an opportunity for questions and concerns. The patient agrees to contact the [facility] exposure line [phone], local health department [address]  ([phone] and PCP office for questions related to their healthcare. CTN/ACM provided contact information for future reference.

## 2020-07-13 ENCOUNTER — VIRTUAL VISIT (OUTPATIENT)
Dept: FAMILY MEDICINE CLINIC | Age: 30
End: 2020-07-13

## 2020-07-13 ENCOUNTER — TELEPHONE (OUTPATIENT)
Dept: FAMILY MEDICINE CLINIC | Age: 30
End: 2020-07-13

## 2020-07-13 DIAGNOSIS — M25.50 PAIN, JOINT, MULTIPLE SITES: Primary | ICD-10-CM

## 2020-07-13 DIAGNOSIS — K59.00 CONSTIPATION, UNSPECIFIED CONSTIPATION TYPE: ICD-10-CM

## 2020-07-13 RX ORDER — TRIAMCINOLONE ACETONIDE 1 MG/G
OINTMENT TOPICAL 2 TIMES DAILY
Qty: 30 G | Refills: 0 | Status: SHIPPED | OUTPATIENT
Start: 2020-07-13 | End: 2022-01-31

## 2020-07-13 NOTE — TELEPHONE ENCOUNTER
----- Message from Gale Denise MD sent at 7/13/2020  9:16 AM EDT -----  Please obtain pap records from [facility].  Sees Sienna Metz NP

## 2020-07-13 NOTE — PROGRESS NOTES
Wilman Costa is a 26 y.o. female who was seen by synchronous (real-time) audio-video technology on 7/13/2020 for Follow-up        Assessment & Plan:   Diagnoses and all orders for this visit:    Pain, joint, multiple sites: It has been improved significantly. Review last note from Dr. Ashlee Doss. Blood work to rule out autoimmune and rheumatological process labs were within normal limit. Currently she is asymptomatic. No unusual fatigue. No joint swelling or pain. Will observe and symptomatic treatment as needed. There was a concern for PMR but labs been negative and symptoms improved. Will hold off on rheumatology referral at this time    Constipation, unspecified constipation type: Symptomatic treatment. Walking, high-fiber diet, drinking lots of water and as needed Metamucil over-the-counter. Recent TSH within normal limit    Other orders  -     triamcinolone acetonide (KENALOG) 0.1 % ointment; Apply  to affected area two (2) times a day. use thin layer, Normal, Disp-30 g,R-0    Patient follows [facility] for Pap. Last Pap was last year. Will obtain records. She had abnormal Pap smear but repeat was okay. Patient was offered DEXA scan to be done by OB/GYN. Due to pandemic situation she has not completed it yet. Will review once OB/GYN note is available. Patient understood and agree with above plan. 712  Subjective:     Done visit with [facility]. Here  to discuss labs and follow-up on her multiple site joint pain. Last visit she has seen Dr. Ashlee Doss. Reviewed their notes. She had bilateral shoulder pain. X-rays negative. Currently she is improved symptomatically. Said she had stopped the apple last years around September and since then the generalized pain and joint pain has been improved. Sitting comfortable and did not appear in any acute distress. She has constipation but that is chronic problem for her. Recent thyroid function within normal limit.   No abdominal pain.  No appetite or weight changes. Not taking any medication at this time. Advised to take Metamucil daily. Denies any headache, dizziness, no chest pain or trouble breathing, no arm or leg weakness. No nausea or vomiting, no weight or appetite changes, no mood changes . No urinary  complains, no palpitation, no diaphoresis. No abdominal pain. No cold or cough. No leg swelling. No fever. No sleep trouble. Lab Results   Component Value Date/Time    WBC 5.6 03/09/2020 12:00 AM    HGB 13.2 03/09/2020 12:00 AM    HCT 37.8 03/09/2020 12:00 AM    PLATELET 424 26/49/7711 12:00 AM    MCV 89 03/09/2020 12:00 AM     Lab Results   Component Value Date/Time    Sodium 140 03/09/2020 12:00 AM    Potassium 4.5 03/09/2020 12:00 AM    Chloride 102 03/09/2020 12:00 AM    CO2 22 03/09/2020 12:00 AM    Anion gap 11 07/23/2013 06:55 PM    Glucose 88 03/09/2020 12:00 AM    BUN 10 03/09/2020 12:00 AM    Creatinine 0.78 03/09/2020 12:00 AM    BUN/Creatinine ratio 13 03/09/2020 12:00 AM    GFR est  03/09/2020 12:00 AM    GFR est non- 03/09/2020 12:00 AM    Calcium 9.5 03/09/2020 12:00 AM    Bilirubin, total 0.4 03/09/2020 12:00 AM    Alk. phosphatase 48 03/09/2020 12:00 AM    Protein, total 7.2 03/09/2020 12:00 AM    Albumin 4.5 03/09/2020 12:00 AM    Globulin 4.5 (H) 07/23/2013 06:55 PM    A-G Ratio 1.7 03/09/2020 12:00 AM    ALT (SGPT) 12 03/09/2020 12:00 AM    AST (SGOT) 18 03/09/2020 12:00 AM       Lab Results   Component Value Date/Time    TSH 2.050 03/09/2020 12:00 AM       Lab Results   Component Value Date/Time    VITAMIN D, 25-HYDROXY 29.1 (L) 04/08/2019 12:00 AM       Lab Results   Component Value Date/Time    Sed rate (ESR) 26 03/09/2020 12:00 AM     Negative ESAU and RF     Prior to Admission medications    Medication Sig Start Date End Date Taking? Authorizing Provider   triamcinolone acetonide (KENALOG) 0.1 % ointment Apply  to affected area two (2) times a day.  use thin layer 7/13/20  Yes Dmitriy Elliott MD   CALCIUM PO Take 1 Tab by mouth daily. Yes Provider, Historical   cholecalciferol, vitamin D3, (VITAMIN D3 PO) Take  by mouth. Yes Provider, Historical   montelukast (SINGULAIR) 10 mg tablet Take 1 Tab by mouth daily. 5/8/19  Yes Safia Vanessa MD   folic acid 476 mcg tablet Take 800 mcg by mouth daily. Yes Provider, Historical   vitamin e (E GEMS) 100 unit capsule Take  by mouth daily. Yes Provider, Historical   cyanocobalamin (VITAMIN B12) 100 mcg tablet Take 100 mcg by mouth daily. Yes Provider, Historical   Biotin 2,500 mcg cap Take  by mouth. Yes Provider, Historical   multivitamin (ONE A DAY) tablet Take 1 Tab by mouth daily. Yes Other, MD Patrice   valACYclovir (VALTREX) 1 gram tablet  1/9/20   Provider, Historical   triamcinolone acetonide (KENALOG) 0.1 % topical cream Apply  to affected area two (2) times daily as needed for Skin Irritation. 1/13/20 7/13/20  Safia Vanessa MD   albuterol (PROVENTIL HFA, VENTOLIN HFA, PROAIR HFA) 90 mcg/actuation inhaler Take 2 Puffs by inhalation every four (4) hours as needed for Wheezing. 4/8/19   Safia Vanessa MD   medroxyPROGESTERone (DEPO-PROVERA) 150 mg/mL injection 150 mg by IntraMUSCular route once. 7/13/20  Provider, Historical     Patient Active Problem List    Diagnosis Date Noted    Normal pregnancy, repeat 07/16/2013     Current Outpatient Medications   Medication Sig Dispense Refill    triamcinolone acetonide (KENALOG) 0.1 % ointment Apply  to affected area two (2) times a day. use thin layer 30 g 0    CALCIUM PO Take 1 Tab by mouth daily.  cholecalciferol, vitamin D3, (VITAMIN D3 PO) Take  by mouth.  montelukast (SINGULAIR) 10 mg tablet Take 1 Tab by mouth daily. 30 Tab 1    folic acid 624 mcg tablet Take 800 mcg by mouth daily.  vitamin e (E GEMS) 100 unit capsule Take  by mouth daily.  cyanocobalamin (VITAMIN B12) 100 mcg tablet Take 100 mcg by mouth daily.  Biotin 2,500 mcg cap Take  by mouth.       multivitamin (ONE A DAY) tablet Take 1 Tab by mouth daily.  valACYclovir (VALTREX) 1 gram tablet       albuterol (PROVENTIL HFA, VENTOLIN HFA, PROAIR HFA) 90 mcg/actuation inhaler Take 2 Puffs by inhalation every four (4) hours as needed for Wheezing. 1 Inhaler 0     No Known Allergies  Past Medical History:   Diagnosis Date    Bell's palsy     Constipation     Dysphagia     Umbilical hernia      Social History     Tobacco Use    Smoking status: Never Smoker    Smokeless tobacco: Never Used   Substance Use Topics    Alcohol use: Yes     Frequency: Monthly or less     Drinks per session: 1 or 2     Binge frequency: Never     Comment: socially       ROS: See HPI    Objective:     Patient-Reported Vitals 7/13/2020   Patient-Reported LMP currently having period      General: alert, cooperative, no distress   Mental  status: normal mood, behavior, speech, dress, motor activity, and thought processes, able to follow commands   HENT: NCAT   Neck: no visualized mass   Resp: no respiratory distress   Neuro: no gross deficits   Skin: no discoloration or lesions of concern on visible areas   Psychiatric: normal affect, consistent with stated mood, no evidence of hallucinations     Additional exam findings: We discussed the expected course, resolution and complications of the diagnosis(es) in detail. Medication risks, benefits, costs, interactions, and alternatives were discussed as indicated. I advised her to contact the office if her condition worsens, changes or fails to improve as anticipated. She expressed understanding with the diagnosis(es) and plan. [facility], who was evaluated through a patient-initiated, synchronous (real-time) audio-video encounter, and/or her healthcare decision maker, is aware that it is a billable service, with coverage as determined by her insurance carrier. She provided verbal consent to proceed: Yes, and patient identification was verified.  It was conducted pursuant to the emergency declaration under the [address] [facility], [facility] authority and the [employer] and [employer] Act. A caregiver was present when appropriate. Ability to conduct physical exam was limited. I was in the office. The patient was at home.       Mandy Smith MD

## 2020-07-13 NOTE — TELEPHONE ENCOUNTER
[facility]. Patient's last pap was over 1 year ago. Copy of report will be faxed to [facility] today.

## 2021-11-01 ENCOUNTER — TELEPHONE (OUTPATIENT)
Dept: FAMILY MEDICINE CLINIC | Age: 31
End: 2021-11-01

## 2021-11-01 NOTE — TELEPHONE ENCOUNTER
Attempted to contact patient [phone] to advise that she is overdue for follow up. Per phone recording \"the service you are attempting to use has been restricted or is unavailable. Please contact customer care. Marisabel Pro \"     Letter sent.

## 2021-11-01 NOTE — TELEPHONE ENCOUNTER
----- Message from Vania Early MD sent at 3/16/2021  3:37 PM EDT -----  Due for follow-up.   Depression screen did not meet

## 2022-01-31 ENCOUNTER — OFFICE VISIT (OUTPATIENT)
Dept: FAMILY MEDICINE CLINIC | Age: 32
End: 2022-01-31
Payer: MEDICAID

## 2022-01-31 VITALS
SYSTOLIC BLOOD PRESSURE: 108 MMHG | HEIGHT: 66 IN | WEIGHT: 174 LBS | RESPIRATION RATE: 16 BRPM | DIASTOLIC BLOOD PRESSURE: 64 MMHG | OXYGEN SATURATION: 98 % | BODY MASS INDEX: 27.97 KG/M2 | TEMPERATURE: 97.9 F | HEART RATE: 91 BPM

## 2022-01-31 DIAGNOSIS — Z34.90 PREGNANCY, UNSPECIFIED GESTATIONAL AGE: ICD-10-CM

## 2022-01-31 DIAGNOSIS — L30.9 ECZEMA, UNSPECIFIED TYPE: ICD-10-CM

## 2022-01-31 DIAGNOSIS — Z00.00 WELL WOMAN EXAM (NO GYNECOLOGICAL EXAM): Primary | ICD-10-CM

## 2022-01-31 DIAGNOSIS — M79.644 PAIN OF RIGHT THUMB: ICD-10-CM

## 2022-01-31 PROCEDURE — 99395 PREV VISIT EST AGE 18-39: CPT | Performed by: FAMILY MEDICINE

## 2022-01-31 NOTE — PROGRESS NOTES
Chief Complaint   Patient presents with    Well Woman     last pap 6/6/19    Thumb Pain     happeed early in pregnancy- felt like whole thumb came out    Other     Pregnant- Due 3/24/22     1. \"Have you been to the ER, urgent care clinic since your last visit? Hospitalized since your last visit? \" No    2. \"Have you seen or consulted any other health care providers outside of the [address] [place] [address] [place] since your last visit? \" [address] (5 [facility])     3. For patients aged 39-70: Has the patient had a colonoscopy / FIT/ Cologuard? Yes - no Care Gap present      If the patient is female:    4. For patients aged 41-77: Has the patient had a mammogram within the past 2 years? Yes - no Care Gap present      5. For patients aged 21-65: Has the patient had a pap smear?  Yes - no Care Gap present

## 2022-01-31 NOTE — PROGRESS NOTES
Subjective:   33 y.o. female for Well Woman Check. Last Pap smear in 2019. No prior history of abnormal Pap smear. Currently she is 33 weeks pregnant. Following OB/GYN. Has on and off blood thinning contraction and scant discharge on and off. She is following OB/GYN recommendation. Sitting comfortable without any acute distress. Initial few weeks she had hyperemesis which has improved at this time. Appetite and weight has been appropriate during pregnancy. Not sexually active at this time. Does on and off self breast.  No concern. No family history of breast cancer, cervical cancer, ovarian cancer or colon cancer. Not much compliant with the prenatal vitamins which I have advised to take it with compliance. Complaining of right thumb pain in the beginning of the pregnancy as that she was trying to pull something and strained it. I have discussed with her that she has full range of motion and on and off pain and discomfort and she is pregnant at this time would avoid doing the x-ray and do the symptomatic treatment with ice application. No swelling or redness over the right thumb at this time. 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 I have advised to bring the paperwork. She never had any prior positive PPD. Currently asymptomatic. No exposure to active TB person. No cold cough wheezing. No appetite or weight changes. No fever. No unusual fatigue.       Patient Active Problem List    Diagnosis Date Noted    Eczema 01/31/2022    Normal pregnancy, repeat 07/16/2013       No Known Allergies  Past Medical History:   Diagnosis Date    Bell's palsy     Constipation     Dysphagia     Umbilical hernia      Past Surgical History:   Procedure Laterality Date    HX GYN      IUD removed     Family History   Problem Relation Age of Onset    Crohn's Disease Brother      Social History     Tobacco Use    Smoking status: Never Smoker    Smokeless tobacco: Never Used   Substance Use Topics    Alcohol use: Yes     Comment: socially            ROS: Feeling generally well. No TIA's or unusual headaches, no dysphagia. No prolonged cough. No dyspnea or chest pain on exertion. No abdominal pain, change in bowel habits, black or bloody stools. No urinary tract symptoms. No new or unusual musculoskeletal symptoms. Specific concerns today: See HPI    Objective: The patient appears well, alert, oriented x 3, in no distress. Visit Vitals  /64 (BP 1 Location: Left arm, BP Patient Position: Sitting, BP Cuff Size: Adult)   Pulse 91   Temp 97.9 °F (36.6 °C) (Temporal)   Resp 16   Ht 5' 6\" (1.676 m)   Wt 174 lb (78.9 kg)   SpO2 98%   Breastfeeding No   BMI 28.08 kg/m²     ENT normal.  Neck supple. No adenopathy or thyromegaly. [place]. Lungs are clear, good air entry, no wheezes, rhonchi or rales. S1 and S2 normal, no murmurs, regular rate and rhythm. Abdomen soft without tenderness, guarding, mass or organomegaly. Extremities show no edema, normal peripheral pulses. Neurological is normal, no focal findings. Breast and Pelvic exams are deferred. Right thumb: No swelling or redness. No point tenderness. Range of motion fairly stable with discomfort with opposition. Assessment/Plan:       ICD-10-CM ICD-9-CM    1. Well woman exam (no gynecological exam)  Z00.00 V70.0     [V70.0]   2. Pregnancy, unspecified gestational age  Z27.80 V22.2    1. Pain of right thumb: Advised that pain started almost 30 weeks ago. As she is pregnant avoid doing x-ray and symptomatic treatment. Ice application. She understood and agreed with it M79.644 729.5     something she was pulling gave pain    4. Eczema, unspecified type: Symptomatic treatment with the use of reading or Ish cream. L30.9 692.9    Patient understood agreed with the plan  Wants to wait for flu shot and COVID shots after pregnancy.   Follow-up and Dispositions    · Return in about 1 year (around 1/31/2023). Please note that this dictation was completed with Duplia, the computer voice recognition software. Quite often unanticipated grammatical, syntax, homophones, and other interpretive errors are inadvertently transcribed by the computer software. Please disregard these errors. Please excuse any errors that have escaped final proofreading.

## 2022-01-31 NOTE — LETTER
1/31/2022 9:19 AM    Ms. Kori Rubio  [address] [id number]      Ms. Kori Rubio is a patient of [facility] under my care. She was seen today. Please call my office with any question or concern.          Sincerely,      Tyler French MD

## 2022-01-31 NOTE — LETTER
NOTIFICATION RETURN TO WORK / SCHOOL    1/31/2022 9:21 AM    Ms. Reyna Abarca  [address]      To Whom It May Concern:    Reyna Abarca is currently under the care of [facility]. She will return to work/school on: 1/31/2022    If there are questions or concerns please have the patient contact our office.         Sincerely,      Brooklynn Kelley MD

## 2022-01-31 NOTE — PATIENT INSTRUCTIONS
Well Visit, Ages 25 to 48: Care Instructions  Overview     Well visits can help you stay healthy. Your doctor has checked your overall health and may have suggested ways to take good care of yourself. Your doctor also may have recommended tests. At home, you can help prevent illness with healthy eating, regular exercise, and other steps. Follow-up care is a key part of your treatment and safety. Be sure to make and go to all appointments, and call your doctor if you are having problems. It's also a good idea to know your test results and keep a list of the medicines you take. How can you care for yourself at home? · Get screening tests that you and your doctor decide on. Screening helps find diseases before any symptoms appear. · Eat healthy foods. Choose fruits, vegetables, whole grains, protein, and low-fat dairy foods. Limit fat, especially saturated fat. Reduce salt in your diet. · Limit alcohol. If you are a man, have no more than 2 drinks a day or 14 drinks a week. If you are a woman, have no more than 1 drink a day or 7 drinks a week. · Get at least 30 minutes of physical activity on most days of the week. Walking is a good choice. You also may want to do other activities, such as running, swimming, cycling, or playing tennis or team sports. Discuss any changes in your exercise program with your doctor. · Reach and stay at a healthy weight. This will lower your risk for many problems, such as obesity, diabetes, heart disease, and high blood pressure. · Do not smoke or allow others to smoke around you. If you need help quitting, talk to your doctor about stop-smoking programs and medicines. These can increase your chances of quitting for good. · Care for your mental health. It is easy to get weighed down by worry and stress. Learn strategies to manage stress, like deep breathing and mindfulness, and stay connected with your family and community.  If you find you often feel sad or hopeless, talk with your doctor. Treatment can help. · Talk to your doctor about whether you have any risk factors for sexually transmitted infections (STIs). You can help prevent STIs if you wait to have sex with a new partner (or partners) until you've each been tested for STIs. It also helps if you use condoms (male or female condoms) and if you limit your sex partners to one person who only has sex with you. Vaccines are available for some STIs, such as HPV. · Use birth control if it's important to you to prevent pregnancy. Talk with your doctor about the choices available and what might be best for you. · If you think you may have a problem with alcohol or drug use, talk to your doctor. This includes prescription medicines (such as amphetamines and opioids) and illegal drugs (such as cocaine and methamphetamine). Your doctor can help you figure out what type of treatment is best for you. · Protect your skin from too much sun. When you're outdoors from 10 a.m. to 4 p.m., stay in the shade or cover up with clothing and a hat with a wide brim. Wear sunglasses that block UV rays. Even when it's cloudy, put broad-spectrum sunscreen (SPF 30 or higher) on any exposed skin. · See a dentist one or two times a year for checkups and to have your teeth cleaned. · Wear a seat belt in the car. When should you call for help? Watch closely for changes in your health, and be sure to contact your doctor if you have any problems or symptoms that concern you. Where can you learn more? Go to http://www.[employer].com/  Enter P072 in the search box to learn more about \"Well Visit, Ages 25 to 48: Care Instructions. \"  Current as of: February 11, 2021               Content Version: 13.0  © 3504-7780 Healthwise, [employer]. Care instructions adapted under license by [employer] (which disclaims liability or warranty for this information).  If you have questions about a medical condition or this instruction, always ask your healthcare professional. [place] [address] any warranty or liability for your use of this information. Breast Self-Exam: Care Instructions  Your Care Instructions     A breast self-exam is when you check your breasts for lumps or changes. This regular exam helps you learn how your breasts normally look and feel. Most breast problems or changes are not because of cancer. Breast self-exam is not a substitute for a mammogram. Having regular breast exams by your doctor and regular mammograms improve your chances of finding any problems with your breasts. Some women set a time each month to do a step-by-step breast self-exam. Other women like a less formal system. They might look at their breasts as they brush their teeth, or feel their breasts once in a while in the shower. If you notice a change in your breast, tell your doctor. Follow-up care is a key part of your treatment and safety. Be sure to make and go to all appointments, and call your doctor if you are having problems. It's also a good idea to know your test results and keep a list of the medicines you take. How do you do a breast self-exam?  · The best time to examine your breasts is usually one week after your menstrual period begins. Your breasts should not be tender then. If you do not have periods, you might do your exam on a day of the month that is easy to remember. · To examine your breasts:  ? Remove all your clothes above the waist and lie down. When you are lying down, your breast tissue spreads evenly over your chest wall, which makes it easier to feel all your breast tissue. ? Use the pads--not the fingertips--of the 3 middle fingers of your left hand to check your right breast. Move your fingers slowly in small coin-sized circles that overlap. ? Use three levels of pressure to feel of all your breast tissue. Use light pressure to feel the tissue close to the skin surface.  Use medium pressure to feel a little deeper. Use firm pressure to feel your tissue close to your breastbone and ribs. Use each pressure level to feel your breast tissue before moving on to the next spot. ? Check your entire breast, moving up and down as if following a strip from the collarbone to the bra line, and from the armpit to the ribs. Repeat until you have covered the entire breast.  ? Repeat this procedure for your left breast, using the pads of the 3 middle fingers of your right hand. · To examine your breasts while in the shower:  ? Place one arm over your head and lightly soap your breast on that side. ? Using the pads of your fingers, gently move your hand over your breast (in the strip pattern described above), feeling carefully for any lumps or changes. ? Repeat for the other breast.  · Have your doctor inspect anything you notice to see if you need further testing. Where can you learn more? Go to http://www.gray.com/  Enter P148 in the search box to learn more about \"Breast Self-Exam: Care Instructions. \"  Current as of: December 17, 2020               Content Version: 13.0  © 4454-8656 [employer]. Care instructions adapted under license by [employer] (which disclaims liability or warranty for this information). If you have questions about a medical condition or this instruction, always ask your healthcare professional. [place] [address] any warranty or liability for your use of this information. Learning About Pregnancy  Your Care Instructions     Your health in the early weeks of your pregnancy is particularly important for your baby's health. Take good care of yourself. Anything you do that harms your body can also harm your baby. Make sure to go to all of your doctor appointments. Regular checkups will help keep you and your baby healthy. How can you care for yourself at home? Diet    · Eat a balanced diet.  Make sure your diet includes plenty of beans, peas, and leafy green vegetables.     · Do not skip meals or go for many hours without eating. If you are nauseated, try to eat a small, healthy snack every 2 to 3 hours.     · Do not eat fish that has a high level of mercury, such as shark, swordfish, or mackerel. Do not eat more than one can of tuna each week.     · Drink plenty of fluids. If you have kidney, heart, or liver disease and have to limit fluids, talk with your doctor before you increase the amount of fluids you drink.     · Cut down on caffeine, such as coffee, tea, and cola.     · Do not drink alcohol, such as beer, wine, or hard liquor.     · Take a multivitamin that contains at least 400 micrograms (mcg) of folic acid to help prevent birth defects. Fortified cereal and whole wheat bread are good additional sources of folic acid.     · Increase the calcium in your diet. Try to drink a quart of skim milk each day. You may also take calcium supplements and choose foods such as cheese and yogurt. Lifestyle    · Make sure you go to your follow-up appointments.     · Get plenty of rest. You may be unusually tired while you are pregnant.     · Get at least 30 minutes of exercise on most days of the week. Walking is a good choice. If you have not exercised in the past, start out slowly. Take several short walks each day.     · Do not smoke. If you need help quitting, talk to your doctor about stop-smoking programs. These can increase your chances of quitting for good.     · Do not touch cat feces or litter boxes. Also, wash your hands after you handle raw meat, and fully cook all meat before you eat it. Wear gloves when you work in the yard or garden, and wash your hands well when you are done. Cat feces, raw or undercooked meat, and contaminated dirt can cause an infection that may harm your baby or lead to a miscarriage.     · Do not use saunas or hot tubs.  Raising your body temperature may harm your baby.     · Avoid chemical fumes, paint fumes, or poisons.     · Do not use illegal drugs, marijuana, or alcohol. Medicines    · Review all of your medicines with your doctor. Some of your routine medicines may need to be changed to protect your baby.     · Use acetaminophen (Tylenol) to relieve minor problems, such as a mild headache or backache or a mild fever with cold symptoms. Do not use nonsteroidal anti-inflammatory drugs (NSAIDs), such as ibuprofen (Advil, Motrin) or naproxen (Aleve), unless your doctor says it is okay.     · Do not take two or more pain medicines at the same time unless the doctor told you to. Many pain medicines have acetaminophen, which is Tylenol. Too much acetaminophen (Tylenol) can be harmful.     · Take your medicines exactly as prescribed. Call your doctor if you think you are having a problem with your medicine. To manage morning sickness    · If you feel sick when you first wake up, try eating a small snack (such as crackers) before you get out of bed. Allow some time to digest the snack, and then get out of bed slowly.     · Do not skip meals or go for long periods without eating. An empty stomach can make nausea worse.     · Eat small, frequent meals instead of three large meals each day.     · Drink plenty of fluids.     · Eat foods that are high in protein but low in fat.     · If you are taking iron supplements, ask your doctor if they are necessary. Iron can make nausea worse.     · Avoid any smells, such as coffee, that make you feel sick.     · Get lots of rest. Morning sickness may be worse when you are tired. Follow-up care is a key part of your treatment and safety. Be sure to make and go to all appointments, and call your doctor if you are having problems. It's also a good idea to know your test results and keep a list of the medicines you take. Where can you learn more?   Go to http://www.gray.com/  Enter N375 in the search box to learn more about \"Learning About Pregnancy. \"  Current as of: June 16, 2021               Content Version: 13.0  © 6552-5380 Healthwise, [facility]. Care instructions adapted under license by [employer] (which disclaims liability or warranty for this information). If you have questions about a medical condition or this instruction, always ask your healthcare professional. [place] [address] any warranty or liability for your use of this information.

## 2022-10-31 ENCOUNTER — TELEPHONE (OUTPATIENT)
Dept: FAMILY MEDICINE CLINIC | Age: 32
End: 2022-10-31

## 2022-10-31 NOTE — PROGRESS NOTES
1. \"Have you been to the ER, urgent care clinic since your last visit? Hospitalized since your last visit? \" Yes When: [facility] ED 5/19/22 for reactive lymphadenopathy; BV; hypokalemia; colitis. 2. \"Have you seen or consulted any other health care providers outside of the [address] [place] [address] since your last visit? \" [facility] LOV: 9/2022  diagnosed with Crohn's. 3. For patients aged 39-70: Has the patient had a colonoscopy / FIT/ Cologuard? Colonoscopy 7/2022 [facility]. If the patient is female:    4. For patients aged 41-77: Has the patient had a mammogram within the past 2 years? No - patient stated she had mammogram in her 19's. 5. For patients aged 21-65: Has the patient had a pap smear? Yes - Care Gap present. Most recent result on file 3/2021 - [address]). Chief Complaint   Patient presents with    Foot Pain     Right foot pain and swelling since receiving pedicure one month ago    Toe Injury     Discoloration of second toe on right foot after receiving pedicure one month ago    Nipple Problem     Left nipple pain - breast feeds and has issues with nipple healing. Pain level 7.

## 2022-10-31 NOTE — TELEPHONE ENCOUNTER
Patient (two identifiers verified) to advise she is having pain and swelling in her right foot and discoloration of second toe in right foot since receiving a pedicure at the end of September 2022. Pain and swelling is progressively getting worse. Patient agreed to schedule a visit with Dr. Estella Juares on 11/01/22 at 3:15 PM to discuss concern. Patient was advised if symptoms worsen prior to visit with Dr. Estella Juares to go to the urgent care center for sooner evaluation. Patient verbalized understanding. If she decides to go to urgent care, patient was instructed to call [facility] tomorrow to advise to cancel appointment. She verbalized understanding.

## 2022-11-01 ENCOUNTER — OFFICE VISIT (OUTPATIENT)
Dept: FAMILY MEDICINE CLINIC | Age: 32
End: 2022-11-01
Payer: MEDICAID

## 2022-11-01 VITALS
SYSTOLIC BLOOD PRESSURE: 116 MMHG | DIASTOLIC BLOOD PRESSURE: 78 MMHG | WEIGHT: 149 LBS | RESPIRATION RATE: 16 BRPM | HEART RATE: 67 BPM | HEIGHT: 66 IN | TEMPERATURE: 97.7 F | BODY MASS INDEX: 23.95 KG/M2 | OXYGEN SATURATION: 98 %

## 2022-11-01 DIAGNOSIS — L30.9 ECZEMA, UNSPECIFIED TYPE: ICD-10-CM

## 2022-11-01 DIAGNOSIS — L60.0 INGROWN NAIL OF SECOND TOE OF RIGHT FOOT: Primary | ICD-10-CM

## 2022-11-01 PROCEDURE — 99212 OFFICE O/P EST SF 10 MIN: CPT | Performed by: FAMILY MEDICINE

## 2022-11-01 RX ORDER — MESALAMINE 0.38 G/1
CAPSULE, EXTENDED RELEASE ORAL
COMMUNITY
Start: 2022-09-15

## 2022-11-01 RX ORDER — TRIAMCINOLONE ACETONIDE 1 MG/G
OINTMENT TOPICAL 2 TIMES DAILY
Qty: 30 G | Refills: 0 | Status: SHIPPED | OUTPATIENT
Start: 2022-11-01

## 2022-11-01 NOTE — PROGRESS NOTES
HISTORY OF PRESENT ILLNESS  Richardson Lewis is a 27 y.o. female. HPI: Here with concern of pain over the right second toenail. Lateral corner she fell few days ago discoloration of the skin. Today no pain or swelling. Probably ingrown toenail which is improving gradually. No discoloration of skin noted today. No injury. Not taken any medication. Asking for medication refill for eczema. Overall generalized dry skin and she is using daily lotion  Visit Vitals  /78 (BP 1 Location: Left upper arm, BP Patient Position: Sitting, BP Cuff Size: Adult)   Pulse 67   Temp 97.7 °F (36.5 °C) (Temporal)   Resp 16   Ht 5' 6\" (1.676 m)   Wt 149 lb (67.6 kg)   SpO2 98%   Breastfeeding Yes   BMI 24.05 kg/m²         ROS: see HPI     Physical Exam  Skin:     Comments: No dryness of the nipple skin. No pulm cards. No redness or swelling around the nipple. Right toe: No redness or swelling. Mildly ingrown toenail over right corner. No tenderness. No open skin or any discharge. Neurological:      Mental Status: She is alert and oriented to person, place, and time. ASSESSMENT and PLAN    ICD-10-CM ICD-9-CM    1. Ingrown nail of second toe of right foot: Advised to put OTC bacitracin or Neosporin over the lateral corner of right toenail. Currently it is improving. No skin discoloration. No discharge or any open skin. Nontender on touch L60.0 703.0     otc bacitracin or neosporin. 2. Eczema, unspecified type: Given medication refill as needed to use for rash. Continue daily lotion L30.9 692.9       Patient understood agreed with the plan  Follow-up and Dispositions    Return if symptoms worsen or fail to improve. Please note that this dictation was completed with [employer], the [employer] voice recognition software. Quite often unanticipated grammatical, syntax, homophones, and other interpretive errors are inadvertently transcribed by the computer software. Please disregard these errors.   Please excuse any errors that have escaped final proofreading.

## 2024-01-01 NOTE — LETTER
11/1/2021 10:38 AM    Ms. Alejandro Dowling  [address] [facility]      Dear Ms. Jack: We attempted to contact you but the phone number we have on file was not in service. Please call our office at [phone] and schedule a follow up appointment for your continued care.         Sincerely,      Kamila Kamara MD
For information on Fall & Injury Prevention, visit: [email or web address] OR  [email or web address] OR  https://www.cdc.gov/steadi/patient.html